# Patient Record
Sex: MALE | Race: WHITE | Employment: OTHER | URBAN - NONMETROPOLITAN AREA
[De-identification: names, ages, dates, MRNs, and addresses within clinical notes are randomized per-mention and may not be internally consistent; named-entity substitution may affect disease eponyms.]

---

## 2017-01-03 ENCOUNTER — OFFICE VISIT (OUTPATIENT)
Dept: INTERNAL MEDICINE | Age: 77
End: 2017-01-03

## 2017-01-03 VITALS
HEIGHT: 71 IN | BODY MASS INDEX: 25.34 KG/M2 | DIASTOLIC BLOOD PRESSURE: 60 MMHG | HEART RATE: 54 BPM | WEIGHT: 181 LBS | SYSTOLIC BLOOD PRESSURE: 130 MMHG

## 2017-01-03 DIAGNOSIS — E10.9 WELL CONTROLLED TYPE 1 DIABETES MELLITUS (HCC): Primary | ICD-10-CM

## 2017-01-03 DIAGNOSIS — Z79.4 TYPE 2 DIABETES MELLITUS WITHOUT COMPLICATION, WITH LONG-TERM CURRENT USE OF INSULIN (HCC): ICD-10-CM

## 2017-01-03 DIAGNOSIS — I25.10 ASCVD (ARTERIOSCLEROTIC CARDIOVASCULAR DISEASE): ICD-10-CM

## 2017-01-03 DIAGNOSIS — I10 ESSENTIAL HYPERTENSION: ICD-10-CM

## 2017-01-03 DIAGNOSIS — E11.9 TYPE 2 DIABETES MELLITUS WITHOUT COMPLICATION, WITH LONG-TERM CURRENT USE OF INSULIN (HCC): ICD-10-CM

## 2017-01-03 DIAGNOSIS — E78.5 HYPERLIPIDEMIA, UNSPECIFIED HYPERLIPIDEMIA TYPE: ICD-10-CM

## 2017-01-03 PROCEDURE — 93000 ELECTROCARDIOGRAM COMPLETE: CPT | Performed by: INTERNAL MEDICINE

## 2017-01-03 PROCEDURE — 99214 OFFICE O/P EST MOD 30 MIN: CPT | Performed by: INTERNAL MEDICINE

## 2017-06-19 ENCOUNTER — OFFICE VISIT (OUTPATIENT)
Dept: INTERNAL MEDICINE | Age: 77
End: 2017-06-19

## 2017-06-19 VITALS
DIASTOLIC BLOOD PRESSURE: 70 MMHG | BODY MASS INDEX: 24.92 KG/M2 | HEART RATE: 52 BPM | SYSTOLIC BLOOD PRESSURE: 130 MMHG | WEIGHT: 178 LBS | HEIGHT: 71 IN

## 2017-06-19 DIAGNOSIS — I25.10 ASCVD (ARTERIOSCLEROTIC CARDIOVASCULAR DISEASE): Primary | ICD-10-CM

## 2017-06-19 DIAGNOSIS — E78.5 HYPERLIPIDEMIA, UNSPECIFIED HYPERLIPIDEMIA TYPE: ICD-10-CM

## 2017-06-19 DIAGNOSIS — E11.9 TYPE 2 DIABETES MELLITUS WITHOUT COMPLICATION, WITH LONG-TERM CURRENT USE OF INSULIN (HCC): ICD-10-CM

## 2017-06-19 DIAGNOSIS — I10 ESSENTIAL HYPERTENSION: ICD-10-CM

## 2017-06-19 DIAGNOSIS — Z79.4 TYPE 2 DIABETES MELLITUS WITHOUT COMPLICATION, WITH LONG-TERM CURRENT USE OF INSULIN (HCC): ICD-10-CM

## 2017-06-19 LAB — HBA1C MFR BLD: 6 %

## 2017-06-19 PROCEDURE — 99214 OFFICE O/P EST MOD 30 MIN: CPT | Performed by: INTERNAL MEDICINE

## 2017-06-19 PROCEDURE — G8420 CALC BMI NORM PARAMETERS: HCPCS | Performed by: INTERNAL MEDICINE

## 2017-06-19 PROCEDURE — 4040F PNEUMOC VAC/ADMIN/RCVD: CPT | Performed by: INTERNAL MEDICINE

## 2017-06-19 PROCEDURE — G8598 ASA/ANTIPLAT THER USED: HCPCS | Performed by: INTERNAL MEDICINE

## 2017-06-19 PROCEDURE — 3017F COLORECTAL CA SCREEN DOC REV: CPT | Performed by: INTERNAL MEDICINE

## 2017-06-19 PROCEDURE — 3046F HEMOGLOBIN A1C LEVEL >9.0%: CPT | Performed by: INTERNAL MEDICINE

## 2017-06-19 PROCEDURE — 83036 HEMOGLOBIN GLYCOSYLATED A1C: CPT | Performed by: INTERNAL MEDICINE

## 2017-06-19 PROCEDURE — 93000 ELECTROCARDIOGRAM COMPLETE: CPT | Performed by: INTERNAL MEDICINE

## 2017-06-19 PROCEDURE — 1123F ACP DISCUSS/DSCN MKR DOCD: CPT | Performed by: INTERNAL MEDICINE

## 2017-06-19 PROCEDURE — 1036F TOBACCO NON-USER: CPT | Performed by: INTERNAL MEDICINE

## 2017-06-19 PROCEDURE — G8427 DOCREV CUR MEDS BY ELIG CLIN: HCPCS | Performed by: INTERNAL MEDICINE

## 2017-06-19 ASSESSMENT — PATIENT HEALTH QUESTIONNAIRE - PHQ9
2. FEELING DOWN, DEPRESSED OR HOPELESS: 0
SUM OF ALL RESPONSES TO PHQ9 QUESTIONS 1 & 2: 0
SUM OF ALL RESPONSES TO PHQ QUESTIONS 1-9: 0
1. LITTLE INTEREST OR PLEASURE IN DOING THINGS: 0

## 2017-09-25 DIAGNOSIS — E11.9 TYPE 2 DIABETES MELLITUS WITHOUT COMPLICATION, WITH LONG-TERM CURRENT USE OF INSULIN (HCC): ICD-10-CM

## 2017-09-25 DIAGNOSIS — Z79.4 TYPE 2 DIABETES MELLITUS WITHOUT COMPLICATION, WITH LONG-TERM CURRENT USE OF INSULIN (HCC): ICD-10-CM

## 2017-09-27 RX ORDER — INSULIN DETEMIR 100 [IU]/ML
INJECTION, SOLUTION SUBCUTANEOUS
Qty: 15 PEN | Refills: 3 | Status: SHIPPED | OUTPATIENT
Start: 2017-09-27 | End: 2019-06-13

## 2017-12-12 ENCOUNTER — OFFICE VISIT (OUTPATIENT)
Dept: INTERNAL MEDICINE CLINIC | Age: 77
End: 2017-12-12
Payer: MEDICARE

## 2017-12-12 ENCOUNTER — NURSE ONLY (OUTPATIENT)
Dept: INTERNAL MEDICINE CLINIC | Age: 77
End: 2017-12-12
Payer: MEDICARE

## 2017-12-12 VITALS
WEIGHT: 174 LBS | HEART RATE: 60 BPM | DIASTOLIC BLOOD PRESSURE: 72 MMHG | BODY MASS INDEX: 24.36 KG/M2 | HEIGHT: 71 IN | SYSTOLIC BLOOD PRESSURE: 136 MMHG

## 2017-12-12 DIAGNOSIS — I10 ESSENTIAL HYPERTENSION: ICD-10-CM

## 2017-12-12 DIAGNOSIS — Z23 NEED FOR PROPHYLACTIC VACCINATION AND INOCULATION AGAINST INFLUENZA: ICD-10-CM

## 2017-12-12 DIAGNOSIS — E78.5 HYPERLIPIDEMIA, UNSPECIFIED HYPERLIPIDEMIA TYPE: ICD-10-CM

## 2017-12-12 DIAGNOSIS — I25.10 ASCVD (ARTERIOSCLEROTIC CARDIOVASCULAR DISEASE): Primary | ICD-10-CM

## 2017-12-12 DIAGNOSIS — Z79.4 TYPE 2 DIABETES MELLITUS WITHOUT COMPLICATION, WITH LONG-TERM CURRENT USE OF INSULIN (HCC): ICD-10-CM

## 2017-12-12 DIAGNOSIS — E11.9 TYPE 2 DIABETES MELLITUS WITHOUT COMPLICATION, WITH LONG-TERM CURRENT USE OF INSULIN (HCC): ICD-10-CM

## 2017-12-12 LAB
ALT SERPL-CCNC: 7 U/L (ref 11–66)
HBA1C MFR BLD: 5.9 %
LDL CHOLESTEROL DIRECT: 68.57 MG/DL

## 2017-12-12 PROCEDURE — 90662 IIV NO PRSV INCREASED AG IM: CPT | Performed by: INTERNAL MEDICINE

## 2017-12-12 PROCEDURE — G8420 CALC BMI NORM PARAMETERS: HCPCS | Performed by: INTERNAL MEDICINE

## 2017-12-12 PROCEDURE — 36415 COLL VENOUS BLD VENIPUNCTURE: CPT | Performed by: INTERNAL MEDICINE

## 2017-12-12 PROCEDURE — 83036 HEMOGLOBIN GLYCOSYLATED A1C: CPT | Performed by: INTERNAL MEDICINE

## 2017-12-12 PROCEDURE — 93000 ELECTROCARDIOGRAM COMPLETE: CPT | Performed by: INTERNAL MEDICINE

## 2017-12-12 PROCEDURE — 1036F TOBACCO NON-USER: CPT | Performed by: INTERNAL MEDICINE

## 2017-12-12 PROCEDURE — G8427 DOCREV CUR MEDS BY ELIG CLIN: HCPCS | Performed by: INTERNAL MEDICINE

## 2017-12-12 PROCEDURE — 3017F COLORECTAL CA SCREEN DOC REV: CPT | Performed by: INTERNAL MEDICINE

## 2017-12-12 PROCEDURE — 3044F HG A1C LEVEL LT 7.0%: CPT | Performed by: INTERNAL MEDICINE

## 2017-12-12 PROCEDURE — 4040F PNEUMOC VAC/ADMIN/RCVD: CPT | Performed by: INTERNAL MEDICINE

## 2017-12-12 PROCEDURE — 99214 OFFICE O/P EST MOD 30 MIN: CPT | Performed by: INTERNAL MEDICINE

## 2017-12-12 PROCEDURE — G0008 ADMIN INFLUENZA VIRUS VAC: HCPCS | Performed by: INTERNAL MEDICINE

## 2017-12-12 PROCEDURE — G8484 FLU IMMUNIZE NO ADMIN: HCPCS | Performed by: INTERNAL MEDICINE

## 2017-12-12 PROCEDURE — 1123F ACP DISCUSS/DSCN MKR DOCD: CPT | Performed by: INTERNAL MEDICINE

## 2017-12-12 PROCEDURE — G8598 ASA/ANTIPLAT THER USED: HCPCS | Performed by: INTERNAL MEDICINE

## 2017-12-12 NOTE — PROGRESS NOTES
daily. No current facility-administered medications for this visit. Review of Systems - General ROS: negative  Psychological ROS: negative  Respiratory ROS: no cough, shortness of breath, or wheezing  Cardiovascular ROS: no chest pain or dyspnea on exertion  Gastrointestinal ROS: no abdominal pain, change in bowel habits, or black or bloody stools  Genito-Urinary ROS: no dysuria, trouble voiding, or hematuria  Musculoskeletal ROS: negative  Neurological ROS: no TIA or stroke symptoms  Dermatological ROS: negative        Blood pressure 136/72, pulse 60, height 5' 10.87\" (1.8 m), weight 174 lb (78.9 kg). Physical Examination: General appearance - alert, well appearing, and in no distress  Mental status - alert, oriented to person, place, and time  Neck - Neck is supple, no JVD or carotid bruits. No thyromegaly or adenopathy. Chest - clear to auscultation, no wheezes, rales or rhonchi, symmetric air entry  Heart - normal rate, regular rhythm, normal S1, S2, no murmurs, rubs, clicks or gallops  Abdomen - soft, nontender, nondistended, no masses or organomegaly  Neurological - alert, oriented, normal speech, no focal findings or movement disorder noted  Extremities - peripheral pulses normal, no pedal edema, no clubbing or cyanosis  Skin - normal coloration and turgor, no rashes, no suspicious skin lesions noted  DIABETIC FOOT EXAM           1. Type 2 diabetes mellitus without complication, with long-term current use of insulin ([facility])  POCT glycosylated hemoglobin (Hb A1C)    23472 - Collection Capillary Blood Specimen   2. Essential hypertension  EKG 12 Lead   3. Need for prophylactic vaccination and inoculation against influenza  INFLUENZA, HIGH DOSE, 65 YRS +, IM, PF, PREFILL SYR, 0.5ML (FLUZONE HD)    NC ADMIN INFLUENZA VIRUS VAC   4. Hyperlipidemia, unspecified hyperlipidemia type  ALT    LDL Cholesterol, Direct   5.  ASCVD (arteriosclerotic cardiovascular disease)       EKG shows Normal sinus rhythm, normal      Extensive counseling was done regarding diabetes. The goals are to decrease or prevent the complications of diabetes and improve survival. The following goals were discussed  HgbA1c < 7 (providing no hypoglycemia)    BMI  18-25    BP < 130/80    STATIN(medium or high risk)    DIET <20% protein  < 30% fat, no trans fats, calorie restricted if BMI high    URINE MICROALBUMIN/CREATINIE  < 30     DILATED EYE EXAM EVERY 1-2 YEARS    MONITOR FEET FOR SORES, NEUROPATHY, ETC    REGULAR DENTAL CARE  Hemoglobin A1c is 5.7  Per the 2013 ACC/AHA guidelines on the treatment of blood cholesterol to reduce atherosclerotic cardiovascular risk in adults there are four major statin benefit groups. Individuals   1) with clinical ASCVD    2) with primary elevations of LDL-C> 190 mg/dl    3) without clinical ASCVD or diabetes with LDL-C >70 to 189 mg/dl and estimated 10 year ASCVD risk> 7.5%    4) with diabetes aged 43-69 with LDL-C 70 to 189 mg/dl and without clinical ASCVD    Patient falls in to group 1      The recommended treatment for each group is as follows:  1) high-intensity statin, if age > 76 or not a candidate for high-intensity statin moderate intensity statin    2) high- intensity statin( moderate if not candidate for high)    3)moderate-to-high intensity statin    4) moderate -intensity statin, however if 10 year ASCVD risk > 7.5%, high-intensity statin    High-risk statin lowers LDL-C > 50%( atorvastatin 40mg, 80 mg and rosuvastatin 20 mg, 40 mg)  Moderate risk statin lowers LDL-C 30% to 50%( simvastatin 20-40mg, atorvastatin 10, 20 mg, rosuvastatin 5, 10 mg, pravastatin 40, 80 mg, lovastatin 40 mg, fluvastatinXL 80 mg, fluvastatin 40 mg bid, pitavastatin 2-4 mg)  Patient is currently on atorvastatin 80 mg po daily  I discussed the situation at length with patient and answered all questions  I recommend the flu shot for the patient today.  Risks and benefits were discussed and patient was given the vaccine          Follow-up here 6 months

## 2017-12-12 NOTE — PROGRESS NOTES
After obtaining consent, and per orders of Dr. Angela Shepard, injection of Fluzone HD given in Right deltoid by Morris Bustillo. Patient instructed to remain in clinic for 20 minutes afterwards, and to report any adverse reaction to me immediately.

## 2018-04-09 ENCOUNTER — HOSPITAL ENCOUNTER (EMERGENCY)
Age: 78
Discharge: HOME | End: 2018-04-10
Attending: FAMILY MEDICINE
Payer: MEDICARE

## 2018-04-09 ENCOUNTER — APPOINTMENT (OUTPATIENT)
Dept: CT IMAGING | Age: 78
End: 2018-04-09
Payer: MEDICARE

## 2018-04-09 ENCOUNTER — APPOINTMENT (OUTPATIENT)
Dept: GENERAL RADIOLOGY | Age: 78
End: 2018-04-09
Payer: MEDICARE

## 2018-04-09 DIAGNOSIS — R31.0 GROSS HEMATURIA: Primary | ICD-10-CM
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
## 2018-04-09 LAB
ALBUMIN SERPL-MCNC: 4.1 G/DL (ref 3.5–5.1)
ALP BLD-CCNC: 79 U/L (ref 38–126)
ALT SERPL-CCNC: 9 U/L (ref 11–66)
ANION GAP SERPL CALCULATED.3IONS-SCNC: 12 MEQ/L (ref 8–16)
APTT: 30.1 SECONDS (ref 22–38)
AST SERPL-CCNC: 42 U/L (ref 5–40)
BACTERIA: ABNORMAL /HPF
BASOPHILS # BLD: 0.4 %
BASOPHILS ABSOLUTE: 0 THOU/MM3 (ref 0–0.1)
BILIRUB SERPL-MCNC: 0.6 MG/DL (ref 0.3–1.2)
BILIRUBIN URINE: ABNORMAL
BILIRUBIN URINE: NEGATIVE
BLOOD, URINE: ABNORMAL
BLOOD, URINE: ABNORMAL
BUN BLDV-MCNC: 26 MG/DL (ref 7–22)
BUN, WHOLE BLOOD: 28 MG/DL (ref 8–26)
CALCIUM SERPL-MCNC: 9.1 MG/DL (ref 8.5–10.5)
CASTS 2: ABNORMAL /LPF
CASTS UA: ABNORMAL /LPF
CHARACTER, URINE: ABNORMAL
CHARACTER, URINE: CLEAR
CHLORIDE BLD-SCNC: 98 MEQ/L (ref 98–111)
CHLORIDE, WHOLE BLOOD: 104 MEQ/L (ref 98–109)
CO2: 29 MEQ/L (ref 23–33)
COLOR: ABNORMAL
COLOR: ABNORMAL
CREAT SERPL-MCNC: 1 MG/DL (ref 0.4–1.2)
CREATININE, WHOLE BLOOD: 1.3 MG/DL (ref 0.5–1.2)
CRYSTALS, UA: ABNORMAL
EOSINOPHIL # BLD: 3.3 %
EOSINOPHILS ABSOLUTE: 0.2 THOU/MM3 (ref 0–0.4)
EPITHELIAL CELLS, UA: ABNORMAL /HPF
GFR SERPL CREATININE-BSD FRML MDRD: 73 ML/MIN/1.73M2
GFR, ESTIMATED: 57 ML/MIN/1.73M2
GLUCOSE BLD-MCNC: 100 MG/DL (ref 70–108)
GLUCOSE URINE: NEGATIVE MG/DL
GLUCOSE, URINE: NEGATIVE MG/DL
GLUCOSE, WHOLE BLOOD: 103 MG/DL (ref 70–108)
HCT VFR BLD CALC: 36.8 % (ref 42–52)
HEMOGLOBIN: 12.6 GM/DL (ref 14–18)
INR BLD: 0.89 (ref 0.85–1.13)
KETONES, URINE: ABNORMAL
KETONES, URINE: ABNORMAL
LACTIC ACID: 1.1 MMOL/L (ref 0.5–2.2)
LEUKOCYTE ESTERASE, URINE: ABNORMAL
LEUKOCYTES, UA: ABNORMAL
LIPASE: 45.5 U/L (ref 5.6–51.3)
LYMPHOCYTES # BLD: 21.5 %
LYMPHOCYTES ABSOLUTE: 1.3 THOU/MM3 (ref 1–4.8)
MCH RBC QN AUTO: 31.3 PG (ref 27–31)
MCHC RBC AUTO-ENTMCNC: 34.2 GM/DL (ref 33–37)
MCV RBC AUTO: 91 FL (ref 80–94)
MISCELLANEOUS 2: ABNORMAL
MONOCYTES # BLD: 9.7 %
MONOCYTES ABSOLUTE: 0.6 THOU/MM3 (ref 0.4–1.3)
NITRATE, UA: NEGATIVE
NITRITE, URINE: NEGATIVE
NUCLEATED RED BLOOD CELLS: 0 /100 WBC
OSMOLALITY CALCULATION: 282.4 MOSMOL/KG (ref 275–300)
PDW BLD-RTO: 13.8 % (ref 11.5–14.5)
PH UA: 6.5
PH UA: 6.5 (ref 5–9)
PLATELET # BLD: 180 THOU/MM3 (ref 130–400)
PMV BLD AUTO: 9.2 FL (ref 7.4–10.4)
POTASSIUM REFLEX MAGNESIUM: 4.2 MEQ/L (ref 3.5–5.2)
POTASSIUM, WHOLE BLOOD: 4.6 MEQ/L (ref 3.5–4.9)
PROTEIN UA: 30
PROTEIN UA: >= 300 MG/DL
RBC # BLD: 4.03 MILL/MM3 (ref 4.7–6.1)
RBC URINE: > 200 /HPF
REFLEX TO URINE C & S: ABNORMAL
RENAL EPITHELIAL, UA: ABNORMAL
SEG NEUTROPHILS: 65.1 %
SEGMENTED NEUTROPHILS ABSOLUTE COUNT: 4 THOU/MM3 (ref 1.8–7.7)
SODIUM BLD-SCNC: 139 MEQ/L (ref 135–145)
SODIUM, WHOLE BLOOD: 140 MEQ/L (ref 138–146)
SPECIFIC GRAVITY UA: 1.02 (ref 1–1.03)
SPECIFIC GRAVITY, URINE: 1.01 (ref 1–1.03)
TOTAL CO2, VENOUS: 29 MEQ/L (ref 23–33)
TOTAL PROTEIN: 6.7 G/DL (ref 6.1–8)
UROBILINOGEN, URINE: 1 EU/DL
UROBILINOGEN, URINE: 4 EU/DL (ref 0–1)
WBC # BLD: 6.2 THOU/MM3 (ref 4.8–10.8)
WBC UA: ABNORMAL /HPF
YEAST: ABNORMAL

## 2018-04-09 PROCEDURE — 82565 ASSAY OF CREATININE: CPT

## 2018-04-09 PROCEDURE — 85610 PROTHROMBIN TIME: CPT

## 2018-04-09 PROCEDURE — 81001 URINALYSIS AUTO W/SCOPE: CPT

## 2018-04-09 PROCEDURE — 85730 THROMBOPLASTIN TIME PARTIAL: CPT

## 2018-04-09 PROCEDURE — 87184 SC STD DISK METHOD PER PLATE: CPT

## 2018-04-09 PROCEDURE — 99284 EMERGENCY DEPT VISIT MOD MDM: CPT

## 2018-04-09 PROCEDURE — 87086 URINE CULTURE/COLONY COUNT: CPT

## 2018-04-09 PROCEDURE — 80053 COMPREHEN METABOLIC PANEL: CPT

## 2018-04-09 PROCEDURE — 2580000003 HC RX 258: Performed by: FAMILY MEDICINE

## 2018-04-09 PROCEDURE — 99215 OFFICE O/P EST HI 40 MIN: CPT

## 2018-04-09 PROCEDURE — 36415 COLL VENOUS BLD VENIPUNCTURE: CPT

## 2018-04-09 PROCEDURE — 87077 CULTURE AEROBIC IDENTIFY: CPT

## 2018-04-09 PROCEDURE — 73030 X-RAY EXAM OF SHOULDER: CPT

## 2018-04-09 PROCEDURE — 87186 SC STD MICRODIL/AGAR DIL: CPT

## 2018-04-09 PROCEDURE — 81003 URINALYSIS AUTO W/O SCOPE: CPT

## 2018-04-09 PROCEDURE — 84520 ASSAY OF UREA NITROGEN: CPT

## 2018-04-09 PROCEDURE — 74177 CT ABD & PELVIS W/CONTRAST: CPT

## 2018-04-09 PROCEDURE — 6360000004 HC RX CONTRAST MEDICATION: Performed by: FAMILY MEDICINE

## 2018-04-09 PROCEDURE — 83690 ASSAY OF LIPASE: CPT

## 2018-04-09 PROCEDURE — 80051 ELECTROLYTE PANEL: CPT

## 2018-04-09 PROCEDURE — 83605 ASSAY OF LACTIC ACID: CPT

## 2018-04-09 PROCEDURE — 82947 ASSAY GLUCOSE BLOOD QUANT: CPT

## 2018-04-09 PROCEDURE — 85025 COMPLETE CBC W/AUTO DIFF WBC: CPT

## 2018-04-09 RX ORDER — 0.9 % SODIUM CHLORIDE 0.9 %
500 INTRAVENOUS SOLUTION INTRAVENOUS ONCE
Status: COMPLETED | OUTPATIENT
Start: 2018-04-09 | End: 2018-04-10

## 2018-04-09 RX ORDER — 0.9 % SODIUM CHLORIDE 0.9 %
1000 INTRAVENOUS SOLUTION INTRAVENOUS ONCE
Status: DISCONTINUED | OUTPATIENT
Start: 2018-04-09 | End: 2018-04-10 | Stop reason: HOSPADM

## 2018-04-09 RX ADMIN — IOPAMIDOL 80 ML: 755 INJECTION, SOLUTION INTRAVENOUS at 21:39

## 2018-04-09 RX ADMIN — SODIUM CHLORIDE 500 ML: 9 INJECTION, SOLUTION INTRAVENOUS at 21:30

## 2018-04-09 ASSESSMENT — ENCOUNTER SYMPTOMS
CONSTIPATION: 0
ABDOMINAL PAIN: 0
COLOR CHANGE: 0
SHORTNESS OF BREATH: 0
BLOOD IN STOOL: 0
NAUSEA: 0
DIARRHEA: 0
VOMITING: 0

## 2018-04-10 ENCOUNTER — PROCEDURE VISIT (OUTPATIENT)
Dept: UROLOGY | Age: 78
End: 2018-04-10
Payer: MEDICARE

## 2018-04-10 ENCOUNTER — OFFICE VISIT (OUTPATIENT)
Dept: UROLOGY | Age: 78
End: 2018-04-10
Payer: MEDICARE

## 2018-04-10 VITALS
BODY MASS INDEX: 23.94 KG/M2 | DIASTOLIC BLOOD PRESSURE: 63 MMHG | SYSTOLIC BLOOD PRESSURE: 132 MMHG | TEMPERATURE: 98.4 F | OXYGEN SATURATION: 98 % | RESPIRATION RATE: 20 BRPM | WEIGHT: 171 LBS | HEART RATE: 65 BPM

## 2018-04-10 VITALS
WEIGHT: 176.3 LBS | HEIGHT: 71 IN | DIASTOLIC BLOOD PRESSURE: 60 MMHG | BODY MASS INDEX: 24.68 KG/M2 | SYSTOLIC BLOOD PRESSURE: 122 MMHG

## 2018-04-10 VITALS
DIASTOLIC BLOOD PRESSURE: 60 MMHG | WEIGHT: 176.3 LBS | BODY MASS INDEX: 24.68 KG/M2 | HEIGHT: 71 IN | SYSTOLIC BLOOD PRESSURE: 122 MMHG

## 2018-04-10 DIAGNOSIS — R31.0 GROSS HEMATURIA: ICD-10-CM

## 2018-04-10 DIAGNOSIS — N28.1 RENAL CYST, RIGHT: Primary | ICD-10-CM

## 2018-04-10 DIAGNOSIS — R31.0 GROSS HEMATURIA: Primary | ICD-10-CM

## 2018-04-10 PROCEDURE — 1123F ACP DISCUSS/DSCN MKR DOCD: CPT | Performed by: NURSE PRACTITIONER

## 2018-04-10 PROCEDURE — 99203 OFFICE O/P NEW LOW 30 MIN: CPT | Performed by: NURSE PRACTITIONER

## 2018-04-10 PROCEDURE — G8427 DOCREV CUR MEDS BY ELIG CLIN: HCPCS | Performed by: NURSE PRACTITIONER

## 2018-04-10 PROCEDURE — 4040F PNEUMOC VAC/ADMIN/RCVD: CPT | Performed by: NURSE PRACTITIONER

## 2018-04-10 PROCEDURE — G8420 CALC BMI NORM PARAMETERS: HCPCS | Performed by: NURSE PRACTITIONER

## 2018-04-10 PROCEDURE — 99999 PR OFFICE/OUTPT VISIT,PROCEDURE ONLY: CPT | Performed by: UROLOGY

## 2018-04-10 PROCEDURE — 52000 CYSTOURETHROSCOPY: CPT | Performed by: UROLOGY

## 2018-04-10 PROCEDURE — G8598 ASA/ANTIPLAT THER USED: HCPCS | Performed by: NURSE PRACTITIONER

## 2018-04-10 PROCEDURE — 1036F TOBACCO NON-USER: CPT | Performed by: NURSE PRACTITIONER

## 2018-04-10 PROCEDURE — 6370000000 HC RX 637 (ALT 250 FOR IP): Performed by: FAMILY MEDICINE

## 2018-04-10 RX ORDER — LEVOFLOXACIN 500 MG/1
500 TABLET, FILM COATED ORAL DAILY
Qty: 10 TABLET | Refills: 0 | Status: SHIPPED | OUTPATIENT
Start: 2018-04-10 | End: 2018-04-20

## 2018-04-10 RX ORDER — LEVOFLOXACIN 500 MG/1
500 TABLET, FILM COATED ORAL ONCE
Status: COMPLETED | OUTPATIENT
Start: 2018-04-10 | End: 2018-04-10

## 2018-04-10 RX ADMIN — LEVOFLOXACIN 500 MG: 500 TABLET, FILM COATED ORAL at 00:38

## 2018-04-11 LAB
ORGANISM: ABNORMAL
ORGANISM: ABNORMAL
URINE CULTURE REFLEX: ABNORMAL
URINE CULTURE REFLEX: ABNORMAL

## 2018-04-16 ENCOUNTER — HOSPITAL ENCOUNTER (OUTPATIENT)
Dept: ULTRASOUND IMAGING | Age: 78
Discharge: HOME | End: 2018-04-16
Payer: MEDICARE

## 2018-04-16 DIAGNOSIS — N28.1 RENAL CYST, RIGHT: ICD-10-CM

## 2018-04-16 LAB — CYTOLOGY-NON GYN: NORMAL

## 2018-04-16 PROCEDURE — 76770 US EXAM ABDO BACK WALL COMP: CPT

## 2018-04-17 ENCOUNTER — OFFICE VISIT (OUTPATIENT)
Dept: UROLOGY | Age: 78
End: 2018-04-17
Payer: MEDICARE

## 2018-04-17 VITALS
HEIGHT: 69 IN | SYSTOLIC BLOOD PRESSURE: 120 MMHG | DIASTOLIC BLOOD PRESSURE: 78 MMHG | WEIGHT: 174 LBS | BODY MASS INDEX: 25.77 KG/M2

## 2018-04-17 DIAGNOSIS — R31.0 GROSS HEMATURIA: Primary | ICD-10-CM

## 2018-04-17 LAB
BILIRUBIN URINE: NEGATIVE
BLOOD URINE, POC: NEGATIVE
CHARACTER, URINE: CLEAR
COLOR, URINE: YELLOW
GLUCOSE URINE: NEGATIVE MG/DL
KETONES, URINE: NEGATIVE
LEUKOCYTE CLUMPS, URINE: NEGATIVE
NITRITE, URINE: NEGATIVE
PH, URINE: 5.5
PROTEIN, URINE: NEGATIVE MG/DL
SPECIFIC GRAVITY, URINE: 1.02 (ref 1–1.03)
UROBILINOGEN, URINE: 0.2 EU/DL

## 2018-04-17 PROCEDURE — 81003 URINALYSIS AUTO W/O SCOPE: CPT | Performed by: UROLOGY

## 2018-04-17 PROCEDURE — 99213 OFFICE O/P EST LOW 20 MIN: CPT | Performed by: UROLOGY

## 2018-06-12 ENCOUNTER — OFFICE VISIT (OUTPATIENT)
Dept: INTERNAL MEDICINE CLINIC | Age: 78
End: 2018-06-12
Payer: MEDICARE

## 2018-06-12 VITALS
HEIGHT: 69 IN | HEART RATE: 60 BPM | WEIGHT: 174 LBS | SYSTOLIC BLOOD PRESSURE: 138 MMHG | BODY MASS INDEX: 25.77 KG/M2 | DIASTOLIC BLOOD PRESSURE: 60 MMHG

## 2018-06-12 DIAGNOSIS — E78.5 HYPERLIPIDEMIA, UNSPECIFIED HYPERLIPIDEMIA TYPE: ICD-10-CM

## 2018-06-12 DIAGNOSIS — I25.10 ASCVD (ARTERIOSCLEROTIC CARDIOVASCULAR DISEASE): Primary | ICD-10-CM

## 2018-06-12 DIAGNOSIS — I10 ESSENTIAL HYPERTENSION: ICD-10-CM

## 2018-06-12 DIAGNOSIS — E11.9 TYPE 2 DIABETES MELLITUS WITHOUT COMPLICATION, WITH LONG-TERM CURRENT USE OF INSULIN (HCC): ICD-10-CM

## 2018-06-12 DIAGNOSIS — Z79.4 TYPE 2 DIABETES MELLITUS WITHOUT COMPLICATION, WITH LONG-TERM CURRENT USE OF INSULIN (HCC): ICD-10-CM

## 2018-06-12 PROCEDURE — G8427 DOCREV CUR MEDS BY ELIG CLIN: HCPCS | Performed by: INTERNAL MEDICINE

## 2018-06-12 PROCEDURE — 1123F ACP DISCUSS/DSCN MKR DOCD: CPT | Performed by: INTERNAL MEDICINE

## 2018-06-12 PROCEDURE — G8598 ASA/ANTIPLAT THER USED: HCPCS | Performed by: INTERNAL MEDICINE

## 2018-06-12 PROCEDURE — 4040F PNEUMOC VAC/ADMIN/RCVD: CPT | Performed by: INTERNAL MEDICINE

## 2018-06-12 PROCEDURE — 93000 ELECTROCARDIOGRAM COMPLETE: CPT | Performed by: INTERNAL MEDICINE

## 2018-06-12 PROCEDURE — G8419 CALC BMI OUT NRM PARAM NOF/U: HCPCS | Performed by: INTERNAL MEDICINE

## 2018-06-12 PROCEDURE — 99214 OFFICE O/P EST MOD 30 MIN: CPT | Performed by: INTERNAL MEDICINE

## 2018-06-12 PROCEDURE — 1036F TOBACCO NON-USER: CPT | Performed by: INTERNAL MEDICINE

## 2018-06-12 RX ORDER — CYANOCOBALAMIN 1000 UG/ML
1000 INJECTION INTRAMUSCULAR; SUBCUTANEOUS
COMMUNITY
End: 2021-01-21 | Stop reason: ALTCHOICE

## 2018-06-15 LAB
ALT SERPL-CCNC: 10 U/L (ref 5–50)
ANION GAP SERPL CALCULATED.3IONS-SCNC: 12 MEQ/L (ref 10–19)
AVERAGE GLUCOSE: 120 MG/DL (ref 66–114)
BUN BLDV-MCNC: 27 MG/DL (ref 8–23)
CALCIUM SERPL-MCNC: 9.5 MG/DL (ref 8.5–10.5)
CHLORIDE BLD-SCNC: 100 MEQ/L (ref 95–107)
CO2: 29 MEQ/L (ref 19–31)
CREAT SERPL-MCNC: 1.1 MG/DL (ref 0.8–1.4)
EGFR AFRICAN AMERICAN: 75.2 ML/MIN/1.73 M2
EGFR IF NONAFRICAN AMERICAN: 64.9 ML/MIN/1.73 M2
GLUCOSE: 95 MG/DL (ref 70–99)
HBA1C MFR BLD: 5.8 % (ref 4.2–5.8)
LDL CHOLESTEROL DIRECT: 72 MG/DL
POTASSIUM SERPL-SCNC: 5.3 MEQ/L (ref 3.5–5.4)
SODIUM BLD-SCNC: 141 MEQ/L (ref 135–146)

## 2018-07-16 ENCOUNTER — PROCEDURE VISIT (OUTPATIENT)
Dept: UROLOGY | Age: 78
End: 2018-07-16
Payer: MEDICARE

## 2018-07-16 VITALS
HEIGHT: 71 IN | DIASTOLIC BLOOD PRESSURE: 72 MMHG | WEIGHT: 174 LBS | BODY MASS INDEX: 24.36 KG/M2 | SYSTOLIC BLOOD PRESSURE: 120 MMHG

## 2018-07-16 DIAGNOSIS — R31.0 GROSS HEMATURIA: Primary | ICD-10-CM

## 2018-07-16 LAB
BILIRUBIN URINE: NEGATIVE
BLOOD URINE, POC: ABNORMAL
CHARACTER, URINE: CLEAR
COLOR, URINE: YELLOW
GLUCOSE URINE: NEGATIVE MG/DL
KETONES, URINE: NEGATIVE
LEUKOCYTE CLUMPS, URINE: NEGATIVE
NITRITE, URINE: NEGATIVE
PH, URINE: 5.5
PROTEIN, URINE: NEGATIVE MG/DL
SPECIFIC GRAVITY, URINE: 1.01 (ref 1–1.03)
UROBILINOGEN, URINE: 0.2 EU/DL

## 2018-07-16 PROCEDURE — 52000 CYSTOURETHROSCOPY: CPT | Performed by: UROLOGY

## 2018-07-16 PROCEDURE — 99999 PR OFFICE/OUTPT VISIT,PROCEDURE ONLY: CPT | Performed by: UROLOGY

## 2018-07-16 PROCEDURE — 81003 URINALYSIS AUTO W/O SCOPE: CPT | Performed by: UROLOGY

## 2018-07-16 NOTE — PROGRESS NOTES
Mr. Charlie Rizvi was seen in follow up for surveillance cystoscopy. This was completed today without difficulty    Past Medical History:   Diagnosis Date    CAD (coronary artery disease)     % MANAGED MEDICALLY    Hyperlipidemia     Hypertension     Left ventricular dysfunction     MILD    Type II or unspecified type diabetes mellitus without mention of complication, not stated as uncontrolled        Past Surgical History:   Procedure Laterality Date    ADENOIDECTOMY  at the age of 13 yrs     TONSILLECTOMY  at the age of 13 yrs old       Current Outpatient Prescriptions on File Prior to Visit   Medication Sig Dispense Refill    cyanocobalamin 1000 MCG/ML injection Inject 1,000 mcg into the muscle every 30 days      LEVEMIR FLEXTOUCH 100 UNIT/ML injection pen INJECT 20 UNITS NIGHTLY 15 Pen 3    lisinopril (PRINIVIL;ZESTRIL) 2.5 MG tablet Take 2.5 mg by mouth daily      Glucose Blood (BLOOD GLUCOSE TEST STRIPS) STRP Test Blood Sugars once daily. Accu-Check test strips. Dx E11.9 100 strip 3    metoprolol (LOPRESSOR) 50 MG tablet Take 0.5 tablets by mouth 2 times daily 60 tablet 1    Insulin Pen Needle 31G X 5 MM MISC 1 each by Does not apply route 3 times daily 300 each 5    metFORMIN (GLUCOPHAGE) 1000 MG tablet Take 1 tablet by mouth 2 times daily (with meals) 180 tablet 3    Multiple Vitamin (MULTI-VITAMIN PO) Take  by mouth.  aspirin 81 MG tablet Take 81 mg by mouth daily.  atorvastatin (LIPITOR) 80 MG tablet Take 80 mg by mouth daily.  nitroGLYCERIN (NITROSTAT) 0.4 MG SL tablet Place 1 tablet under the tongue every 5 minutes as needed. 25 tablet 5     No current facility-administered medications on file prior to visit. No Known Allergies    Family History   Problem Relation Age of Onset    Prostate Cancer Paternal Uncle        Social History     Social History    Marital status:       Spouse name: N/A    Number of children: N/A    Years of education: N/A normal.  The prostate was moderately obstructing. The bladder was evaluated in its endoscopic entirety and found to be normal.  There were no tumors, stones, ulcers or foreign bodies. There were no mucosal abnormalities. The ureteral orifices were seen and were normal.  The scope was removed. The patient tolerated the procedure and there were no complications. A dose of 500 mg ciprofloxacin was given for the procedure. Impression: Moderate BPH, otherwise normal cystoscopy        Plan:  Normal cystoscopy today. Follow up with [facility] in a year. Cytology prior.

## 2018-08-09 LAB
CHOLESTEROL, TOTAL: 132 MG/DL
CHOLESTEROL/HDL RATIO: NORMAL
HDLC SERPL-MCNC: 40 MG/DL (ref 35–70)
LDL CHOLESTEROL CALCULATED: 50 MG/DL (ref 0–160)
TRIGL SERPL-MCNC: 208 MG/DL
VLDLC SERPL CALC-MCNC: NORMAL MG/DL

## 2018-10-05 ENCOUNTER — CARE COORDINATION (OUTPATIENT)
Dept: CARE COORDINATION | Age: 78
End: 2018-10-05

## 2018-12-05 ENCOUNTER — OFFICE VISIT (OUTPATIENT)
Dept: INTERNAL MEDICINE CLINIC | Age: 78
End: 2018-12-05
Payer: MEDICARE

## 2018-12-05 VITALS
BODY MASS INDEX: 25.06 KG/M2 | WEIGHT: 179 LBS | SYSTOLIC BLOOD PRESSURE: 130 MMHG | DIASTOLIC BLOOD PRESSURE: 62 MMHG | HEIGHT: 71 IN | HEART RATE: 52 BPM

## 2018-12-05 DIAGNOSIS — I10 ESSENTIAL HYPERTENSION: ICD-10-CM

## 2018-12-05 DIAGNOSIS — E78.5 HYPERLIPIDEMIA, UNSPECIFIED HYPERLIPIDEMIA TYPE: ICD-10-CM

## 2018-12-05 DIAGNOSIS — E11.9 TYPE 2 DIABETES MELLITUS WITHOUT COMPLICATION, WITH LONG-TERM CURRENT USE OF INSULIN (HCC): ICD-10-CM

## 2018-12-05 DIAGNOSIS — I25.10 ASCVD (ARTERIOSCLEROTIC CARDIOVASCULAR DISEASE): Primary | ICD-10-CM

## 2018-12-05 DIAGNOSIS — Z79.4 TYPE 2 DIABETES MELLITUS WITHOUT COMPLICATION, WITH LONG-TERM CURRENT USE OF INSULIN (HCC): ICD-10-CM

## 2018-12-05 PROCEDURE — 1101F PT FALLS ASSESS-DOCD LE1/YR: CPT | Performed by: INTERNAL MEDICINE

## 2018-12-05 PROCEDURE — G8427 DOCREV CUR MEDS BY ELIG CLIN: HCPCS | Performed by: INTERNAL MEDICINE

## 2018-12-05 PROCEDURE — 4040F PNEUMOC VAC/ADMIN/RCVD: CPT | Performed by: INTERNAL MEDICINE

## 2018-12-05 PROCEDURE — 1123F ACP DISCUSS/DSCN MKR DOCD: CPT | Performed by: INTERNAL MEDICINE

## 2018-12-05 PROCEDURE — G8484 FLU IMMUNIZE NO ADMIN: HCPCS | Performed by: INTERNAL MEDICINE

## 2018-12-05 PROCEDURE — 1036F TOBACCO NON-USER: CPT | Performed by: INTERNAL MEDICINE

## 2018-12-05 PROCEDURE — 93000 ELECTROCARDIOGRAM COMPLETE: CPT | Performed by: INTERNAL MEDICINE

## 2018-12-05 PROCEDURE — 99214 OFFICE O/P EST MOD 30 MIN: CPT | Performed by: INTERNAL MEDICINE

## 2018-12-05 PROCEDURE — G8419 CALC BMI OUT NRM PARAM NOF/U: HCPCS | Performed by: INTERNAL MEDICINE

## 2018-12-05 PROCEDURE — G8598 ASA/ANTIPLAT THER USED: HCPCS | Performed by: INTERNAL MEDICINE

## 2018-12-05 ASSESSMENT — PATIENT HEALTH QUESTIONNAIRE - PHQ9
1. LITTLE INTEREST OR PLEASURE IN DOING THINGS: 0
SUM OF ALL RESPONSES TO PHQ QUESTIONS 1-9: 0
2. FEELING DOWN, DEPRESSED OR HOPELESS: 0
SUM OF ALL RESPONSES TO PHQ9 QUESTIONS 1 & 2: 0
SUM OF ALL RESPONSES TO PHQ QUESTIONS 1-9: 0

## 2018-12-05 NOTE — PROGRESS NOTES
PO) Take  by mouth. No current facility-administered medications for this visit. Review of Systems - General ROS: negative  Psychological ROS: negative  Respiratory ROS: no cough, shortness of breath, or wheezing  Cardiovascular ROS: no chest pain or dyspnea on exertion  Gastrointestinal ROS: no abdominal pain, change in bowel habits, or black or bloody stools  Genito-Urinary ROS: no dysuria, trouble voiding, or hematuria  Musculoskeletal ROS: negative  Neurological ROS: no TIA or stroke symptoms  Dermatological ROS: negative        Blood pressure 130/62, pulse 52, height 5' 10.51\" (1.791 m), weight 179 lb (81.2 kg). Physical Examination: General appearance - alert, well appearing, and in no distress  Mental status - alert, oriented to person, place, and time  Neck - Neck is supple, no JVD or carotid bruits. No thyromegaly or adenopathy. Chest - clear to auscultation, no wheezes, rales or rhonchi, symmetric air entry  Heart - normal rate, regular rhythm, normal S1, S2, no murmurs, rubs, clicks or gallops  Abdomen - soft, nontender, nondistended, no masses or organomegaly  Neurological - alert, oriented, normal speech, no focal findings or movement disorder noted  Extremities - peripheral pulses normal, no pedal edema, no clubbing or cyanosis  Skin - normal coloration and turgor, no rashes, no suspicious skin lesions noted              Diagnosis Orders   1. ASCVD (arteriosclerotic cardiovascular disease)     2. Essential hypertension  EKG 12 Lead   3. Hyperlipidemia, unspecified hyperlipidemia type     4. Type 2 diabetes mellitus without complication, with long-term current use of insulin (HCC)       EKG shows Sinus bradycardia      Extensive counseling was done regarding diabetes.  The goals are to decrease or prevent the complications of diabetes and improve survival. The following goals were discussed  HgbA1c < 7 (providing no hypoglycemia)    BMI  18-25    BP < 130/80    STATIN(medium or high risk)    DIET <20% protein  < 30% fat, no trans fats, calorie restricted if BMI high    URINE MICROALBUMIN/CREATINIE  < 30     DILATED EYE EXAM EVERY 1-2 YEARS    MONITOR FEET FOR SORES, NEUROPATHY, ETC    REGULAR DENTAL CARE    Per the 2016 guidelines regarding cholesterol management and secondary prevention. It is recommended LDL be less than 70. In June, his LDL was 72. He did have recent blood work in the [place], we will get this  He is on a high intensity statin.    Atorvastatin 80 mg by mouth daily  Follow-up 6 months

## 2019-06-13 ENCOUNTER — OFFICE VISIT (OUTPATIENT)
Dept: INTERNAL MEDICINE CLINIC | Age: 79
End: 2019-06-13
Payer: MEDICARE

## 2019-06-13 VITALS
HEIGHT: 71 IN | WEIGHT: 177 LBS | HEART RATE: 58 BPM | DIASTOLIC BLOOD PRESSURE: 70 MMHG | BODY MASS INDEX: 24.78 KG/M2 | SYSTOLIC BLOOD PRESSURE: 136 MMHG

## 2019-06-13 DIAGNOSIS — E11.9 TYPE 2 DIABETES MELLITUS WITHOUT COMPLICATION, WITHOUT LONG-TERM CURRENT USE OF INSULIN (HCC): ICD-10-CM

## 2019-06-13 DIAGNOSIS — E78.5 HYPERLIPIDEMIA, UNSPECIFIED HYPERLIPIDEMIA TYPE: ICD-10-CM

## 2019-06-13 DIAGNOSIS — I10 ESSENTIAL HYPERTENSION: ICD-10-CM

## 2019-06-13 DIAGNOSIS — I25.10 ASCVD (ARTERIOSCLEROTIC CARDIOVASCULAR DISEASE): Primary | ICD-10-CM

## 2019-06-13 LAB — HBA1C MFR BLD: 5.7 % (ref 4.3–5.7)

## 2019-06-13 PROCEDURE — G8598 ASA/ANTIPLAT THER USED: HCPCS | Performed by: INTERNAL MEDICINE

## 2019-06-13 PROCEDURE — 93000 ELECTROCARDIOGRAM COMPLETE: CPT | Performed by: INTERNAL MEDICINE

## 2019-06-13 PROCEDURE — G8427 DOCREV CUR MEDS BY ELIG CLIN: HCPCS | Performed by: INTERNAL MEDICINE

## 2019-06-13 PROCEDURE — 83036 HEMOGLOBIN GLYCOSYLATED A1C: CPT | Performed by: INTERNAL MEDICINE

## 2019-06-13 PROCEDURE — 4040F PNEUMOC VAC/ADMIN/RCVD: CPT | Performed by: INTERNAL MEDICINE

## 2019-06-13 PROCEDURE — 99214 OFFICE O/P EST MOD 30 MIN: CPT | Performed by: INTERNAL MEDICINE

## 2019-06-13 PROCEDURE — G8419 CALC BMI OUT NRM PARAM NOF/U: HCPCS | Performed by: INTERNAL MEDICINE

## 2019-06-13 PROCEDURE — 1036F TOBACCO NON-USER: CPT | Performed by: INTERNAL MEDICINE

## 2019-06-13 PROCEDURE — 1123F ACP DISCUSS/DSCN MKR DOCD: CPT | Performed by: INTERNAL MEDICINE

## 2019-06-13 NOTE — PROGRESS NOTES
Chief Complaint   Patient presents with    Coronary Artery Disease    Diabetes    Hypertension    Hyperlipidemia       Patient presents for evaluation of diabetes, ASCVD   I last saw him 6 months ago  Patient has not had any admissions to the hospital  since the last visit here. Pertinent past history reviewed and summarized below     Hgba1c was 12.3 in July 2015  I started insulin at that time  He was on metformin only      He has been taking Levemir at night    He has a known RCA lesion  managed medically  He denies chest pain, shortness of breath, dizziness or syncope  Denies any hypoglycemia  Past Medical History:   Diagnosis Date    CAD (coronary artery disease)     % MANAGED MEDICALLY    Hyperlipidemia     Hypertension     Left ventricular dysfunction     MILD    Type II or unspecified type diabetes mellitus without mention of complication, not stated as uncontrolled      . Current Outpatient Medications   Medication Sig Dispense Refill    cyanocobalamin 1000 MCG/ML injection Inject 1,000 mcg into the muscle every 30 days      lisinopril (PRINIVIL;ZESTRIL) 2.5 MG tablet Take 2.5 mg by mouth daily      Glucose Blood (BLOOD GLUCOSE TEST STRIPS) STRP Test Blood Sugars once daily. Accu-Check test strips. Dx E11.9 100 strip 3    metoprolol (LOPRESSOR) 50 MG tablet Take 0.5 tablets by mouth 2 times daily 60 tablet 1    metFORMIN (GLUCOPHAGE) 1000 MG tablet Take 1 tablet by mouth 2 times daily (with meals) 180 tablet 3    nitroGLYCERIN (NITROSTAT) 0.4 MG SL tablet Place 1 tablet under the tongue every 5 minutes as needed. 25 tablet 5    Multiple Vitamin (MULTI-VITAMIN PO) Take  by mouth.  aspirin 81 MG tablet Take 81 mg by mouth daily.  atorvastatin (LIPITOR) 80 MG tablet Take 80 mg by mouth daily. No current facility-administered medications for this visit.           Review of Systems - General ROS: negative  Psychological ROS: negative  Respiratory ROS: no cough, shortness of breath, or wheezing  Cardiovascular ROS: no chest pain or dyspnea on exertion  Gastrointestinal ROS: no abdominal pain, change in bowel habits, or black or bloody stools  Genito-Urinary ROS: no dysuria, trouble voiding, or hematuria  Musculoskeletal ROS: negative  Neurological ROS: no TIA or stroke symptoms  Dermatological ROS: negative        Blood pressure 136/70, pulse 58, height 5' 10.5\" (1.791 m), weight 177 lb (80.3 kg). Physical Examination: General appearance - alert, well appearing, and in no distress  Mental status - alert, oriented to person, place, and time  Neck - Neck is supple, no JVD or carotid bruits. No thyromegaly or adenopathy. Chest - clear to auscultation, no wheezes, rales or rhonchi, symmetric air entry  Heart - normal rate, regular rhythm, normal S1, S2, no murmurs, rubs, clicks or gallops  Abdomen - soft, nontender, nondistended, no masses or organomegaly  Neurological - alert, oriented, normal speech, no focal findings or movement disorder noted  Extremities - peripheral pulses normal, no pedal edema, no clubbing or cyanosis  Skin - normal coloration and turgor, no rashes, no suspicious skin lesions noted              Diagnosis Orders   1. ASCVD (arteriosclerotic cardiovascular disease)     2. Essential hypertension  EKG 12 Lead   3. Hyperlipidemia, unspecified hyperlipidemia type     4. Type 2 diabetes mellitus without complication, without long-term current use of insulin (HCC)       EKG shows Sinus bradycardia      Extensive counseling was done regarding diabetes.  The goals are to decrease or prevent the complications of diabetes and improve survival. The following goals were discussed  HgbA1c < 7 (providing no hypoglycemia)    BMI  18-25    BP < 130/80    STATIN(medium or high risk)    DIET <20% protein  < 30% fat, no trans fats, calorie restricted if BMI high    URINE MICROALBUMIN/CREATINIE  < 30     DILATED EYE EXAM EVERY 1-2 YEARS    MONITOR FEET FOR SORES, NEUROPATHY, ETC    REGULAR DENTAL CARE  Hemoglobin A1c is 5.7  Per the 2016 guidelines regarding cholesterol management and secondary prevention. It is recommended LDL be less than 70. In June, his LDL was 72. He did have recent blood work in the [address], we will get this  He is on a high intensity statin.    Atorvastatin 80 mg by mouth daily  Follow-up 6 months

## 2019-07-16 NOTE — PROGRESS NOTES
with normal A/P diameter, no wheezes, rales, or rhonchi noted. Normal respiratory rate and rhthym. No use of accessory muscles. Abdominal:          Soft. No tenderness. Active bowel sounds. Musculoskeletal:    Normal range of motion. She exhibits no edema or tenderness of lower extremities. Extremities:    No cyanosis, clubbing, or edema present. Neurological:    Alert and oriented. No cranial nerve deficit. There are no focalizing motor or sensory deficits.     DATA:  CBC:   Lab Results   Component Value Date    WBC 6.2 04/09/2018    RBC 4.03 04/09/2018    RBC 4.30 03/18/2012    HGB 12.6 04/09/2018    HCT 36.8 04/09/2018    MCV 91 04/09/2018    MCH 31.3 04/09/2018    MCHC 34.2 04/09/2018    RDW 13.8 04/09/2018     04/09/2018    MPV 9.2 04/09/2018     BMP:    Lab Results   Component Value Date     06/14/2018    K 5.3 06/14/2018    K 4.2 04/09/2018     06/14/2018    CO2 29 06/14/2018    BUN 27 06/14/2018    CREATININE 1.1 06/14/2018    CALCIUM 9.5 06/14/2018    LABGLOM 73 04/09/2018    GLUCOSE 95 06/14/2018     BUN/Creatinine:    Lab Results   Component Value Date    BUN 27 06/14/2018    CREATININE 1.1 06/14/2018     Magnesium:  No results found for: MG  Phosphorus:  No results found for: PHOS  PT/INR:    Lab Results   Component Value Date    INR 0.89 04/09/2018     U/A:    Lab Results   Component Value Date    COLORU Yellow 07/16/2018    COLORU RED 04/09/2018    PHUR 6.5 04/09/2018    WBCUA  04/09/2018    RBCUA > 200 04/09/2018    YEAST NONE SEEN 04/09/2018    BACTERIA MANY 04/09/2018    SPECGRAV 1.025 04/09/2018    LEUKOCYTESUR MODERATE 04/09/2018    UROBILINOGEN 0.20 07/16/2018    BILIRUBINUR Negative 07/16/2018    BLOODU Small 07/16/2018    BLOODU LARGE 04/09/2018    GLUCOSEU Negative 07/16/2018       Results for POC orders placed in visit on 07/17/19   POCT Urinalysis No Micro (Auto)   Result Value Ref Range    Glucose, Ur Negative NEGATIVE mg/dl    Bilirubin Urine Negative Ketones, Urine Negative NEGATIVE    Specific Gravity, Urine 1.010 1.002 - 1.03    Blood, UA POC Negative NEGATIVE    pH, Urine 5.50 5.0 - 9.0    Protein, Urine Negative NEGATIVE mg/dl    Urobilinogen, Urine 0.20 0.0 - 1.0 eu/dl    Nitrite, Urine Negative NEGATIVE    Leukocyte Clumps, Urine Negative NEGATIVE    Color, Urine Yellow YELLOW-STR    Character, Urine Clear [id number]         Assessment & Plan:      BPH  Right Renal Cysts  History of Gross Hematuria    UA negative for blood today. We will send urine cytology due to history of gross hematuria and tobacco abuse. Follow-up in 2 years to send cytology.         Electronically signed by GUSTAVO Almanzar CNP on 7/16/2019 at 5:07 PM

## 2019-07-17 ENCOUNTER — OFFICE VISIT (OUTPATIENT)
Dept: UROLOGY | Age: 79
End: 2019-07-17
Payer: MEDICARE

## 2019-07-17 VITALS
BODY MASS INDEX: 24.48 KG/M2 | HEIGHT: 70 IN | SYSTOLIC BLOOD PRESSURE: 136 MMHG | WEIGHT: 171 LBS | DIASTOLIC BLOOD PRESSURE: 60 MMHG

## 2019-07-17 DIAGNOSIS — R31.0 GROSS HEMATURIA: Primary | ICD-10-CM

## 2019-07-17 DIAGNOSIS — Z87.891 HISTORY OF TOBACCO ABUSE: ICD-10-CM

## 2019-07-17 DIAGNOSIS — Q61.00 CONGENITAL RENAL CYST OF RIGHT KIDNEY: ICD-10-CM

## 2019-07-17 DIAGNOSIS — R35.0 BENIGN PROSTATIC HYPERPLASIA WITH URINARY FREQUENCY: ICD-10-CM

## 2019-07-17 DIAGNOSIS — N40.1 BENIGN PROSTATIC HYPERPLASIA WITH URINARY FREQUENCY: ICD-10-CM

## 2019-07-17 LAB
BILIRUBIN URINE: NEGATIVE
BLOOD URINE, POC: NEGATIVE
CHARACTER, URINE: CLEAR
COLOR, URINE: YELLOW
GLUCOSE URINE: NEGATIVE MG/DL
KETONES, URINE: NEGATIVE
LEUKOCYTE CLUMPS, URINE: NEGATIVE
NITRITE, URINE: NEGATIVE
PH, URINE: 5.5 (ref 5–9)
PROTEIN, URINE: NEGATIVE MG/DL
SPECIFIC GRAVITY, URINE: 1.01 (ref 1–1.03)
UROBILINOGEN, URINE: 0.2 EU/DL (ref 0–1)

## 2019-07-17 PROCEDURE — G8427 DOCREV CUR MEDS BY ELIG CLIN: HCPCS | Performed by: NURSE PRACTITIONER

## 2019-07-17 PROCEDURE — 1036F TOBACCO NON-USER: CPT | Performed by: NURSE PRACTITIONER

## 2019-07-17 PROCEDURE — 1123F ACP DISCUSS/DSCN MKR DOCD: CPT | Performed by: NURSE PRACTITIONER

## 2019-07-17 PROCEDURE — G8598 ASA/ANTIPLAT THER USED: HCPCS | Performed by: NURSE PRACTITIONER

## 2019-07-17 PROCEDURE — 99213 OFFICE O/P EST LOW 20 MIN: CPT | Performed by: NURSE PRACTITIONER

## 2019-07-17 PROCEDURE — 81003 URINALYSIS AUTO W/O SCOPE: CPT | Performed by: NURSE PRACTITIONER

## 2019-07-17 PROCEDURE — 4040F PNEUMOC VAC/ADMIN/RCVD: CPT | Performed by: NURSE PRACTITIONER

## 2019-07-17 PROCEDURE — G8420 CALC BMI NORM PARAMETERS: HCPCS | Performed by: NURSE PRACTITIONER

## 2019-12-11 ENCOUNTER — OFFICE VISIT (OUTPATIENT)
Dept: INTERNAL MEDICINE CLINIC | Age: 79
End: 2019-12-11
Payer: MEDICARE

## 2019-12-11 VITALS
HEIGHT: 71 IN | HEART RATE: 61 BPM | DIASTOLIC BLOOD PRESSURE: 64 MMHG | BODY MASS INDEX: 24.81 KG/M2 | SYSTOLIC BLOOD PRESSURE: 137 MMHG | WEIGHT: 177.25 LBS

## 2019-12-11 DIAGNOSIS — E11.9 TYPE 2 DIABETES MELLITUS WITHOUT COMPLICATION, WITHOUT LONG-TERM CURRENT USE OF INSULIN (HCC): Primary | ICD-10-CM

## 2019-12-11 DIAGNOSIS — E78.5 HYPERLIPIDEMIA, UNSPECIFIED HYPERLIPIDEMIA TYPE: ICD-10-CM

## 2019-12-11 DIAGNOSIS — I10 ESSENTIAL HYPERTENSION: ICD-10-CM

## 2019-12-11 DIAGNOSIS — I25.10 ASCVD (ARTERIOSCLEROTIC CARDIOVASCULAR DISEASE): ICD-10-CM

## 2019-12-11 LAB — HBA1C MFR BLD: 4.8 % (ref 4.3–5.7)

## 2019-12-11 PROCEDURE — G8484 FLU IMMUNIZE NO ADMIN: HCPCS | Performed by: INTERNAL MEDICINE

## 2019-12-11 PROCEDURE — G8427 DOCREV CUR MEDS BY ELIG CLIN: HCPCS | Performed by: INTERNAL MEDICINE

## 2019-12-11 PROCEDURE — 1123F ACP DISCUSS/DSCN MKR DOCD: CPT | Performed by: INTERNAL MEDICINE

## 2019-12-11 PROCEDURE — G8598 ASA/ANTIPLAT THER USED: HCPCS | Performed by: INTERNAL MEDICINE

## 2019-12-11 PROCEDURE — 99214 OFFICE O/P EST MOD 30 MIN: CPT | Performed by: INTERNAL MEDICINE

## 2019-12-11 PROCEDURE — G8420 CALC BMI NORM PARAMETERS: HCPCS | Performed by: INTERNAL MEDICINE

## 2019-12-11 PROCEDURE — 93000 ELECTROCARDIOGRAM COMPLETE: CPT | Performed by: INTERNAL MEDICINE

## 2019-12-11 PROCEDURE — 4040F PNEUMOC VAC/ADMIN/RCVD: CPT | Performed by: INTERNAL MEDICINE

## 2019-12-11 PROCEDURE — 83036 HEMOGLOBIN GLYCOSYLATED A1C: CPT | Performed by: INTERNAL MEDICINE

## 2019-12-11 PROCEDURE — 1036F TOBACCO NON-USER: CPT | Performed by: INTERNAL MEDICINE

## 2020-07-16 ENCOUNTER — OFFICE VISIT (OUTPATIENT)
Dept: INTERNAL MEDICINE CLINIC | Age: 80
End: 2020-07-16
Payer: MEDICARE

## 2020-07-16 VITALS
BODY MASS INDEX: 26.46 KG/M2 | DIASTOLIC BLOOD PRESSURE: 67 MMHG | SYSTOLIC BLOOD PRESSURE: 139 MMHG | HEIGHT: 71 IN | HEART RATE: 58 BPM | WEIGHT: 189 LBS | TEMPERATURE: 97.7 F

## 2020-07-16 LAB — HBA1C MFR BLD: 7.5 % (ref 4.3–5.7)

## 2020-07-16 PROCEDURE — 99214 OFFICE O/P EST MOD 30 MIN: CPT | Performed by: NURSE PRACTITIONER

## 2020-07-16 PROCEDURE — 1036F TOBACCO NON-USER: CPT | Performed by: NURSE PRACTITIONER

## 2020-07-16 PROCEDURE — 93000 ELECTROCARDIOGRAM COMPLETE: CPT | Performed by: NURSE PRACTITIONER

## 2020-07-16 PROCEDURE — 1123F ACP DISCUSS/DSCN MKR DOCD: CPT | Performed by: NURSE PRACTITIONER

## 2020-07-16 PROCEDURE — 3051F HG A1C>EQUAL 7.0%<8.0%: CPT | Performed by: NURSE PRACTITIONER

## 2020-07-16 PROCEDURE — G8427 DOCREV CUR MEDS BY ELIG CLIN: HCPCS | Performed by: NURSE PRACTITIONER

## 2020-07-16 PROCEDURE — 4040F PNEUMOC VAC/ADMIN/RCVD: CPT | Performed by: NURSE PRACTITIONER

## 2020-07-16 PROCEDURE — 83036 HEMOGLOBIN GLYCOSYLATED A1C: CPT | Performed by: NURSE PRACTITIONER

## 2020-07-16 PROCEDURE — G8417 CALC BMI ABV UP PARAM F/U: HCPCS | Performed by: NURSE PRACTITIONER

## 2020-07-16 ASSESSMENT — PATIENT HEALTH QUESTIONNAIRE - PHQ9
SUM OF ALL RESPONSES TO PHQ QUESTIONS 1-9: 0
SUM OF ALL RESPONSES TO PHQ9 QUESTIONS 1 & 2: 0
2. FEELING DOWN, DEPRESSED OR HOPELESS: 0
SUM OF ALL RESPONSES TO PHQ QUESTIONS 1-9: 0
1. LITTLE INTEREST OR PLEASURE IN DOING THINGS: 0

## 2020-07-16 NOTE — PROGRESS NOTES
[address] [facility] [address] INTERNAL MEDICINE  [address]  [facility] [address]  Dept: [phone]  Dept Fax: [phone]  Loc: [phone]     Visit Date:  7/16/2020    Patient:  Michael Kendall  YOB: 1940    HPI:     Chief Complaint   Patient presents with    Coronary Artery Disease    Diabetes    Hypertension    Hyperlipidemia       PCP Dr. Kasie Flood. Last saw Dr. Mauricio Hunt 12/11/19    A1C 7.5%. He has been eating better, more potatoes, not getting to the gym to work, due to [place]. His Metformin was stopped 12/11/19 due to A1C 4.8%. On Lisinopril 5 mg daily, last MACR 8 1/3/2017. HLD - On Lipitor 80 mg daily. Last lipids 2018, with LDL 50, HDL 40. Due for recheck. HTN  - BP on Metoprolol 25 mg BID, lisinopril 5 mg daily. CAD - No chest pain, SOB, WEI. Last cath 4/3/2008 with Dr. 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 Medications    Current Outpatient Medications:     cyanocobalamin 1000 MCG/ML injection, Inject 1,000 mcg into the muscle every 30 days, Disp: , Rfl:     lisinopril (PRINIVIL;ZESTRIL) 2.5 MG tablet, Take 5 mg by mouth daily , Disp: , Rfl:     Glucose Blood (BLOOD GLUCOSE TEST STRIPS) STRP, Test Blood Sugars once daily. Accu-Check test strips. Dx E11.9, Disp: 100 strip, Rfl: 3    metoprolol (LOPRESSOR) 50 MG tablet, Take 0.5 tablets by mouth 2 times daily, Disp: 60 tablet, Rfl: 1    metFORMIN (GLUCOPHAGE) 1000 MG tablet, Take 1 tablet by mouth 2 times daily (with meals), Disp: 180 tablet, Rfl: 3    nitroGLYCERIN (NITROSTAT) 0.4 MG SL tablet, Place 1 tablet under the tongue every 5 minutes as needed. , Disp: 25 tablet, Rfl: 5    Multiple Vitamin (MULTI-VITAMIN PO), Take  by mouth.  , Disp: , Rfl:     aspirin 81 MG tablet, Take 81 mg by mouth daily. , Disp: , Rfl:     atorvastatin (LIPITOR) 80 MG tablet, Take 80 mg by mouth daily.   , Disp: , Rfl:     The patient has No Known Allergies. Past Medical History  Torrie Lundborg  has a past medical history of CAD (coronary artery disease), Hyperlipidemia, Hypertension, Left ventricular dysfunction, and Type II or unspecified type diabetes mellitus without mention of complication, not stated as uncontrolled. Past Surgical History  The patient  has a past surgical history that includes Tonsillectomy (at the age of 13 yrs old) and Adenoidectomy (at the age of 13 yrs ). Family History  This patient's family history includes Prostate Cancer in his paternal uncle. Social History  Torrie Lundborg  reports that he quit smoking about 28 years ago. His smoking use included cigarettes. He has a 20.00 pack-year smoking history. He has never used smokeless tobacco. He reports that he does not drink alcohol or use drugs. Health Maintenance:    Health Maintenance   Topic Date Due    DTaP/Tdap/Td vaccine (1 - Tdap) 03/12/1961    Shingles Vaccine (1 of 2) 03/12/1992    Pneumococcal 65+ years Vaccine (1 of 1 - PPSV23) 03/12/2007    Annual Wellness Visit (AWV)  06/13/2019    Lipid screen  08/09/2019    Potassium monitoring  08/09/2019    Creatinine monitoring  08/09/2019    Flu vaccine (1) 09/01/2020    Hepatitis A vaccine  Aged Out    Hib vaccine  Aged Out    Meningococcal (ACWY) vaccine  Aged Out       Subjective:      Review of Systems   Constitutional: Negative for chills, fatigue and fever. HENT: Negative for sore throat and trouble swallowing. Eyes: Negative for itching and visual disturbance. Respiratory: Negative for cough, choking and shortness of breath. Cardiovascular: Negative for chest pain and leg swelling. Gastrointestinal: Negative for abdominal pain, constipation, diarrhea, nausea and vomiting. Endocrine: Negative for cold intolerance and heat intolerance. Genitourinary: Negative for difficulty urinating and dysuria. Musculoskeletal: Negative for arthralgias and myalgias.    Skin: Negative - see patient instructions. Discussed use, benefit, and side effects of prescribed medications. All patient questions answered. Pt voiced understanding.  .       Electronically signed GUSTAVO Olivarez CNP on 7/16/20 at 11:16 AM EDT

## 2020-07-16 NOTE — PATIENT INSTRUCTIONS
Check glucose once weekly fasting, and once weekly 2 hours after largest meal. Please call these into the office for advise. Increase attention to diet, exercise and water intake. Watch milk intake, carbs. Fasting labs December of this year. Patient Education        Learning About Diabetes Food Guidelines  Your Care Instructions     Meal planning is important to manage diabetes. It helps keep your blood sugar at a target level (which you set with your doctor). You don't have to eat special foods. You can eat what your family eats, including sweets once in a while. But you do have to pay attention to how often you eat and how much you eat of certain foods. You may want to work with a dietitian or a certified diabetes educator (CDE) to help you plan meals and snacks. A dietitian or CDE can also help you lose weight if that is one of your goals. What should you know about eating carbs? Managing the amount of carbohydrate (carbs) you eat is an important part of healthy meals when you have diabetes. Carbohydrate is found in many foods. · Learn which foods have carbs. And learn the amounts of carbs in different foods. ? Bread, cereal, pasta, and rice have about 15 grams of carbs in a serving. A serving is 1 slice of bread (1 ounce), ½ cup of cooked cereal, or 1/3 cup of cooked pasta or rice. ? Fruits have 15 grams of carbs in a serving. A serving is 1 small fresh fruit, such as an apple or orange; ½ of a banana; ½ cup of cooked or canned fruit; ½ cup of fruit juice; 1 cup of melon or raspberries; or 2 tablespoons of dried fruit. ? Milk and no-sugar-added yogurt have 15 grams of carbs in a serving. A serving is 1 cup of milk or 2/3 cup of no-sugar-added yogurt. ? Starchy vegetables have 15 grams of carbs in a serving. A serving is ½ cup of mashed potatoes or sweet potato; 1 cup winter squash; ½ of a small baked potato; ½ cup of cooked beans; or ½ cup cooked corn or green peas.   · Learn how much carbs to eat each day and at each meal. A dietitian or CDE can teach you how to keep track of the amount of carbs you eat. This is called carbohydrate counting. · If you are not sure how to count carbohydra  · te grams, use the Plate Method to plan meals. It is a good, quick way to make sure that you have a balanced meal. It also helps you spread carbs throughout the day. ? Divide your plate by types of foods. Put non-starchy vegetables on half the plate, meat or other protein food on one-quarter of the plate, and a grain or starchy vegetable in the final quarter of the plate. To this you can add a small piece of fruit and 1 cup of milk or yogurt, depending on how many carbs you are supposed to eat at a meal.  · Try to eat about the same amount of carbs at each meal. Do not \"save up\" your daily allowance of carbs to eat at one meal.  · Proteins have very little or no carbs per serving. Examples of proteins are beef, chicken, turkey, fish, eggs, tofu, cheese, cottage cheese, and peanut butter. A serving size of meat is 3 ounces, which is about the size of a deck of cards. Examples of meat substitute serving sizes (equal to 1 ounce of meat) are 1/4 cup of cottage cheese, 1 egg, 1 tablespoon of peanut butter, and ½ cup of tofu. How can you eat out and still eat healthy? · Learn to estimate the serving sizes of foods that have carbohydrate. If you measure food at home, it will be easier to estimate the amount in a serving of restaurant food. · If the meal you order has too much carbohydrate (such as potatoes, corn, or baked beans), ask to have a low-carbohydrate food instead. Ask for a salad or green vegetables. · If you use insulin, check your blood sugar before and after eating out to help you plan how much to eat in the future. · If you eat more carbohydrate at a meal than you had planned, take a walk or do other exercise. This will help lower your blood sugar. What else should you know?   · Limit saturated fat, such as the fat from meat and dairy products. This is a healthy choice because people who have diabetes are at higher risk of heart disease. So choose lean cuts of meat and nonfat or low-fat dairy products. Use olive or canola oil instead of butter or shortening when cooking. · Don't skip meals. Your blood sugar may drop too low if you skip meals and take insulin or certain medicines for diabetes. · Check with your doctor before you drink alcohol. Alcohol can cause your blood sugar to drop too low. Alcohol can also cause a bad reaction if you take certain diabetes medicines. Follow-up care is a key part of your treatment and safety. Be sure to make and go to all appointments, and call your doctor if you are having problems. It's also a good idea to know your test results and keep a list of the medicines you take. Where can you learn more? Go to [email or web address] org and sign in to your [employer] account. Enter P356 in the [employer] box to learn more about \"Learning About Diabetes Food Guidelines. \"     If you do not have an account, please click on the \"Sign Up Now\" link. Current as of: December 20, 2019               Content Version: 12.5  © 8278-3862 Healthwise, Incorporated. Care instructions adapted under license by [facility] ([facility]). If you have questions about a medical condition or this instruction, always ask your healthcare professional. [address]  any warranty or liability for your use of this information.

## 2020-07-27 ASSESSMENT — ENCOUNTER SYMPTOMS
TROUBLE SWALLOWING: 0
NAUSEA: 0
CHOKING: 0
SHORTNESS OF BREATH: 0
DIARRHEA: 0
EYE ITCHING: 0
VOMITING: 0
CONSTIPATION: 0
SORE THROAT: 0
COUGH: 0
ABDOMINAL PAIN: 0

## 2021-01-21 ENCOUNTER — OFFICE VISIT (OUTPATIENT)
Dept: INTERNAL MEDICINE CLINIC | Age: 81
End: 2021-01-21
Payer: MEDICARE

## 2021-01-21 VITALS
BODY MASS INDEX: 26.41 KG/M2 | SYSTOLIC BLOOD PRESSURE: 120 MMHG | WEIGHT: 188.63 LBS | HEART RATE: 62 BPM | DIASTOLIC BLOOD PRESSURE: 50 MMHG | TEMPERATURE: 96.7 F | HEIGHT: 71 IN

## 2021-01-21 DIAGNOSIS — E78.2 MIXED HYPERLIPIDEMIA: ICD-10-CM

## 2021-01-21 DIAGNOSIS — I10 ESSENTIAL HYPERTENSION: ICD-10-CM

## 2021-01-21 DIAGNOSIS — I25.10 ASCVD (ARTERIOSCLEROTIC CARDIOVASCULAR DISEASE): ICD-10-CM

## 2021-01-21 DIAGNOSIS — E11.9 TYPE 2 DIABETES MELLITUS WITHOUT COMPLICATION, WITHOUT LONG-TERM CURRENT USE OF INSULIN (HCC): Primary | ICD-10-CM

## 2021-01-21 PROCEDURE — 4040F PNEUMOC VAC/ADMIN/RCVD: CPT | Performed by: NURSE PRACTITIONER

## 2021-01-21 PROCEDURE — 99214 OFFICE O/P EST MOD 30 MIN: CPT | Performed by: NURSE PRACTITIONER

## 2021-01-21 PROCEDURE — G8427 DOCREV CUR MEDS BY ELIG CLIN: HCPCS | Performed by: NURSE PRACTITIONER

## 2021-01-21 PROCEDURE — 1036F TOBACCO NON-USER: CPT | Performed by: NURSE PRACTITIONER

## 2021-01-21 PROCEDURE — G8484 FLU IMMUNIZE NO ADMIN: HCPCS | Performed by: NURSE PRACTITIONER

## 2021-01-21 PROCEDURE — G8417 CALC BMI ABV UP PARAM F/U: HCPCS | Performed by: NURSE PRACTITIONER

## 2021-01-21 PROCEDURE — 1123F ACP DISCUSS/DSCN MKR DOCD: CPT | Performed by: NURSE PRACTITIONER

## 2021-01-21 ASSESSMENT — ENCOUNTER SYMPTOMS
DIARRHEA: 0
SORE THROAT: 0
ABDOMINAL PAIN: 0
SHORTNESS OF BREATH: 0
VOMITING: 0
NAUSEA: 0
TROUBLE SWALLOWING: 0
CONSTIPATION: 0
COUGH: 0

## 2021-01-21 NOTE — PATIENT INSTRUCTIONS
Recheck A1C, BMP and triglycerides on/after 2/24/2021. Fasting labs. Get new meter, check twice daily (fasting every day, and when checking in the evening, check 2 hours after the largest meal). Bring meter by for a download in about two weeks. Attention to diet, exercise and water intake to improve glucose. See me back in 6 weeks for follow up, call if problems, bring meter with your for repeat download.

## 2021-01-21 NOTE — PROGRESS NOTES
[facility]  [address] [address]  Dept: [phone]  Dept Fax: [phone]  Loc: [phone]     Visit Date:  1/21/2021    Patient:  Zachery Martins  YOB: 1940    HPI:     Chief Complaint   Patient presents with    Diabetes     6 month follow up    Coronary Artery Disease    Hypertension       DM-last A1c 8.7% on 11/24/2020 at the [place]. Creatinine 1.4, BUN 33. He is on Metformin at [facility] twice daily. Checks glucose twice weekly, running in 120's. Typically checks this fasting in am. Will run around 125 after supper. States he was running 170-180's a couple months ago. He also retired in March, then [place] happened and he states things got out of control. .  He states he got back on his diet. He was eating a lot of potato chips. His meter may not be accurate, he says that at times the glucose will read result before he even gets the strip placed in. On lisinopril 5 mg daily, statin and aspirin. Dilated eye exam and foot exams at [place]. HLD-cholesterol 195, HDL 49, LDL 52, triglycerides 411. On Lipitor 80 mg daily. Discussed etiology of hypertriglyceridemia and its relationship to carb intake and sugars. HTN-/50. ASCVD-on aspirin 81 mg daily, Lipitor 80 mg daily, metoprolol 25 mg twice daily. No chest pain, shortness of breath, orthopnea, dyspnea on exertion. He tolerates activity without difficulty. Last EKG from July 2020 reviewed, sinus bradycardia, no acute changes. Last cath 4/3/2008 with Dr. 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            Medications    Current Outpatient Medications:     Cyanocobalamin (VITAMIN B 12 PO), Take 1,000 mg by mouth daily, Disp: , Rfl:     vitamin D (CHOLECALCIFEROL) 25 MCG (1000 UT) TABS tablet, Take 1,000 Units by mouth daily, Disp: , Rfl:     blood glucose monitor kit and supplies, Check twice daily and as needed for symptoms of low/high blood glucose. Dispense sufficient amount for indicated testing frequency plus additional to accommodate PRN testing needs. Dispense all needed supplies to include: monitor, strips, lancing device, lancets, control solutions, alcohol swabs., Disp: 1 kit, Rfl: 0    lisinopril (PRINIVIL;ZESTRIL) 2.5 MG tablet, Take 5 mg by mouth daily , Disp: , Rfl:     Glucose Blood (BLOOD GLUCOSE TEST STRIPS) STRP, Test Blood Sugars once daily. Accu-Check test strips. Dx E11.9, Disp: 100 strip, Rfl: 3    metoprolol (LOPRESSOR) 50 MG tablet, Take 0.5 tablets by mouth 2 times daily, Disp: 60 tablet, Rfl: 1    metFORMIN (GLUCOPHAGE) 1000 MG tablet, Take 1 tablet by mouth 2 times daily (with meals), Disp: 180 tablet, Rfl: 3    nitroGLYCERIN (NITROSTAT) 0.4 MG SL tablet, Place 1 tablet under the tongue every 5 minutes as needed. , Disp: 25 tablet, Rfl: 5    Multiple Vitamin (MULTI-VITAMIN PO), Take  by mouth.  , Disp: , Rfl:     aspirin 81 MG tablet, Take 81 mg by mouth daily. , Disp: , Rfl:     atorvastatin (LIPITOR) 80 MG tablet, Take 80 mg by mouth daily. , Disp: , Rfl:     The patient has No Known Allergies. Past Medical History  Nitesh Davies  has a past medical history of CAD (coronary artery disease), Hyperlipidemia, Hypertension, Left ventricular dysfunction, and Type II or unspecified type diabetes mellitus without mention of complication, not stated as uncontrolled. Past Surgical History  The patient  has a past surgical history that includes Tonsillectomy (at the age of 13 yrs old) and Adenoidectomy (at the age of 13 yrs ). Family History  This patient's family history includes Prostate Cancer in his paternal uncle. Social History  Nitesh Davies  reports that he quit smoking about 29 years ago. His smoking use included cigarettes. He has a 20.00 pack-year smoking history.  He has never used smokeless tobacco. He reports that he does not drink alcohol or use drugs. Health Maintenance:    Health Maintenance   Topic Date Due    Hepatitis C screen  1940    DTaP/Tdap/Td vaccine (1 - Tdap) 03/12/1961    Shingles Vaccine (1 of 2) 03/12/1992    Pneumococcal 65+ years Vaccine (1 of 1 - PPSV23) 03/12/2007    Annual Wellness Visit (AWV)  06/13/2019    Flu vaccine (1) 09/01/2020    Lipid screen  11/24/2021    Potassium monitoring  11/24/2021    Creatinine monitoring  11/24/2021    Hepatitis A vaccine  Aged Out    Hib vaccine  Aged Out    Meningococcal (ACWY) vaccine  Aged Out       Subjective:      Review of Systems   Constitutional: Negative for chills, fatigue and fever. HENT: Negative for sore throat and trouble swallowing. Eyes: Negative for visual disturbance. Respiratory: Negative for cough and shortness of breath. Cardiovascular: Negative for chest pain and leg swelling. Gastrointestinal: Negative for abdominal pain, constipation, diarrhea, nausea and vomiting. Genitourinary: Negative for difficulty urinating. Musculoskeletal: Negative for arthralgias and myalgias. Skin: Negative for rash and wound. Neurological: Negative for numbness. Objective:     BP (!) 120/50 (Site: Left Upper Arm)   Pulse 62   Temp 96.7 °F (35.9 °C)   Ht 5' 10.98\" (1.803 m)   Wt 188 lb 10.1 oz (85.6 kg)   BMI 26.32 kg/m²     Physical Exam  Vitals signs reviewed. Constitutional:       General: He is not in acute distress. Appearance: He is well-developed. He is not diaphoretic. HENT:      Head: Normocephalic and atraumatic. Mouth/Throat:      Pharynx: No oropharyngeal exudate. Neck:      Musculoskeletal: Normal range of motion and neck supple. Thyroid: No thyromegaly. Cardiovascular:      Rate and Rhythm: Normal rate and regular rhythm. Heart sounds: Normal heart sounds. No murmur. No friction rub. No gallop. Pulmonary:      Effort: Pulmonary effort is normal. No respiratory distress.       Breath sounds: Normal breath sounds. No wheezing or rales. Abdominal:      General: There is no distension. Palpations: Abdomen is soft. Tenderness: There is no abdominal tenderness. Musculoskeletal: Normal range of motion. General: No tenderness. Lymphadenopathy:      Cervical: No cervical adenopathy. Skin:     General: Skin is warm and dry. Coloration: Skin is not pale. Findings: No erythema or rash. Neurological:      Mental Status: He is alert and oriented to person, place, and time. Labs Reviewed 1/21/2021:    Lab Results   Component Value Date    WBC 6.2 04/09/2018    HGB 12.6 (L) 04/09/2018    HCT 36.8 (L) 04/09/2018     04/09/2018    CHOL 132 08/09/2018    TRIG 208 08/09/2018    HDL 40 08/09/2018    LDLDIRECT 72 06/14/2018    ALT 10 06/14/2018    AST 42 (H) 04/09/2018     06/14/2018    K 5.3 06/14/2018     06/14/2018    CREATININE 1.1 06/14/2018    BUN 27 (H) 06/14/2018    CO2 29 06/14/2018    INR 0.89 04/09/2018    LABA1C 7.5 (H) 07/16/2020    LABMICR 1.44 01/03/2017       Assessment/Plan      1. Type 2 diabetes mellitus without complication, without long-term current use of insulin (HCC)  Last A1c 8.7% in November 2020, up from 7.5% in July 2020. I reviewed labs from [place]. I am missing the MACR. We will call and get this. Advised to continue increased attention to diet, exercise and water intake to improve glucose results. Only on Metformin 1000 mg twice daily at this time. He reports his sugars are running around 120s, but he also reports that his meter may be malfunctioning. Therefore we will get him a new meter and instructed him to check his glucose twice daily for the next 2 weeks, fasting in the morning and 2 hours after the largest meal of the day. Bring meter for download in 2 weeks. If his glucoses are in fact elevated, will likely look at starting SGLT2 due to his history of cardiac disease.   We discussed this, and the need to stay hydrated and monitoring of kidney function if this medication were started. We will recheck A1c, BMP and fasting triglycerides on or after 2/24/2021. Meet back here in 6 weeks to review results. We will perform a foot exam at that visit. - Hemoglobin A1C; Future  - Basic Metabolic Panel; Future  - Triglycerides; Future  - blood glucose monitor kit and supplies; Check twice daily and as needed for symptoms of low/high blood glucose. Dispense sufficient amount for indicated testing frequency plus additional to accommodate PRN testing needs. Dispense all needed supplies to include: monitor, strips, lancing device, lancets, control solutions, alcohol swabs. Dispense: 1 kit; Refill: 0    2. Mixed hyperlipidemia  On Lipitor 80 mg. Hepatic function stable. We will recheck triglycerides with next labs. - Triglycerides; Future    3. ASCVD (arteriosclerotic cardiovascular disease)  No signs or symptoms of cardiac decompensation. On appropriate medical therapy at this time. EKG at next visit. 4. Essential hypertension  Blood pressure stable on current medications. EKG at next visit. Return in about 6 weeks (around 3/4/2021). Patient given educational materials - see patient instructions. Discussed use, benefit, and side effects of prescribed medications. All patient questions answered. Pt voiced understanding.        Electronically signed GUSTAVO Tiwari CNP on 1/21/21 at 9:46 AM EST

## 2021-02-05 ENCOUNTER — IMMUNIZATION (OUTPATIENT)
Dept: PRIMARY CARE CLINIC | Age: 81
End: 2021-02-05
Payer: MEDICARE

## 2021-02-05 PROCEDURE — 0011A COVID-19, MODERNA VACCINE 100MCG/0.5ML DOSE: CPT | Performed by: FAMILY MEDICINE

## 2021-02-05 PROCEDURE — 91301 COVID-19, MODERNA VACCINE 100MCG/0.5ML DOSE: CPT | Performed by: FAMILY MEDICINE

## 2021-03-04 ENCOUNTER — OFFICE VISIT (OUTPATIENT)
Dept: INTERNAL MEDICINE CLINIC | Age: 81
End: 2021-03-04
Payer: MEDICARE

## 2021-03-04 ENCOUNTER — NURSE ONLY (OUTPATIENT)
Dept: LAB | Age: 81
End: 2021-03-04

## 2021-03-04 VITALS
DIASTOLIC BLOOD PRESSURE: 81 MMHG | WEIGHT: 184 LBS | TEMPERATURE: 96.4 F | HEART RATE: 69 BPM | BODY MASS INDEX: 25.76 KG/M2 | HEIGHT: 71 IN | SYSTOLIC BLOOD PRESSURE: 126 MMHG | RESPIRATION RATE: 17 BRPM

## 2021-03-04 DIAGNOSIS — I10 ESSENTIAL HYPERTENSION: ICD-10-CM

## 2021-03-04 DIAGNOSIS — E11.9 TYPE 2 DIABETES MELLITUS WITHOUT COMPLICATION, WITHOUT LONG-TERM CURRENT USE OF INSULIN (HCC): Primary | ICD-10-CM

## 2021-03-04 DIAGNOSIS — E11.9 TYPE 2 DIABETES MELLITUS WITHOUT COMPLICATION, WITHOUT LONG-TERM CURRENT USE OF INSULIN (HCC): ICD-10-CM

## 2021-03-04 DIAGNOSIS — E78.2 MIXED HYPERLIPIDEMIA: ICD-10-CM

## 2021-03-04 LAB
ANION GAP SERPL CALCULATED.3IONS-SCNC: 7 MEQ/L (ref 8–16)
BUN BLDV-MCNC: 28 MG/DL (ref 7–22)
C-PEPTIDE: 4.7 NG/ML (ref 1.1–4.4)
CALCIUM SERPL-MCNC: 10.2 MG/DL (ref 8.5–10.5)
CHLORIDE BLD-SCNC: 101 MEQ/L (ref 98–111)
CO2: 30 MEQ/L (ref 23–33)
CREAT SERPL-MCNC: 1.2 MG/DL (ref 0.4–1.2)
GFR SERPL CREATININE-BSD FRML MDRD: 58 ML/MIN/1.73M2
GLUCOSE BLD-MCNC: 215 MG/DL (ref 70–108)
HBA1C MFR BLD: 9.3 % (ref 4.3–5.7)
POTASSIUM SERPL-SCNC: 4.7 MEQ/L (ref 3.5–5.2)
SODIUM BLD-SCNC: 138 MEQ/L (ref 135–145)

## 2021-03-04 PROCEDURE — 1036F TOBACCO NON-USER: CPT | Performed by: NURSE PRACTITIONER

## 2021-03-04 PROCEDURE — 4040F PNEUMOC VAC/ADMIN/RCVD: CPT | Performed by: NURSE PRACTITIONER

## 2021-03-04 PROCEDURE — 1123F ACP DISCUSS/DSCN MKR DOCD: CPT | Performed by: NURSE PRACTITIONER

## 2021-03-04 PROCEDURE — 99214 OFFICE O/P EST MOD 30 MIN: CPT | Performed by: NURSE PRACTITIONER

## 2021-03-04 PROCEDURE — G8417 CALC BMI ABV UP PARAM F/U: HCPCS | Performed by: NURSE PRACTITIONER

## 2021-03-04 PROCEDURE — G8484 FLU IMMUNIZE NO ADMIN: HCPCS | Performed by: NURSE PRACTITIONER

## 2021-03-04 PROCEDURE — 83036 HEMOGLOBIN GLYCOSYLATED A1C: CPT | Performed by: NURSE PRACTITIONER

## 2021-03-04 PROCEDURE — G8427 DOCREV CUR MEDS BY ELIG CLIN: HCPCS | Performed by: NURSE PRACTITIONER

## 2021-03-04 ASSESSMENT — ENCOUNTER SYMPTOMS
VOMITING: 0
SHORTNESS OF BREATH: 0
TROUBLE SWALLOWING: 0
ABDOMINAL PAIN: 0
COUGH: 0
CONSTIPATION: 0
NAUSEA: 0
DIARRHEA: 0
SORE THROAT: 0

## 2021-03-04 NOTE — PROGRESS NOTES
[facility]  [address] [facility] [address]  Dept: [phone]  Dept Fax: [phone]: [phone]     Visit Date:  3/4/2021    Patient:  Bill Ortega  YOB: 1940    HPI:     Chief Complaint   Patient presents with    Diabetes     TYPE 2 -  6 WEEK FU    Hyperlipidemia       HPI    PCP-Dr. Jairo Saavedra    I last saw him 1/21/2021 as follows:   DM-last A1c 8.7% on 11/24/2020 at the [facility] Creatinine 1.4, BUN 33. He is on Metformin 1000 mg twice daily. Checks glucose twice weekly, running in 120's. Typically checks this fasting in am. Will run around 125 after supper. States he was running 170-180's a couple months ago. He also retired in March, then [place] happened and he states things got out of control. .  He states he got back on his diet. He was eating a lot of potato chips. His meter may not be accurate, he says that at times the glucose will read result before he even gets the strip placed in. On lisinopril 5 mg daily, statin and aspirin.     Dilated eye exam and foot exams at [facility].         HLD-cholesterol 195, HDL 49, LDL 52, triglycerides 411. On Lipitor 80 mg daily. Discussed etiology of hypertriglyceridemia and its relationship to carb intake and sugars.     HTN-/50.      ASCVD-on aspirin 81 mg daily, Lipitor 80 mg daily, metoprolol 25 mg twice daily. No chest pain, shortness of breath, orthopnea, dyspnea on exertion. He tolerates activity without difficulty. Last EKG from July 2020 reviewed, sinus bradycardia, no acute changes. Last cath 4/3/2008 with Dr. 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 Today's visit:  DM - Last A1c 8.7 11/24/2020. A1c today 9.7%  I sent a new meter at the last visit. Here today to go over glucose results more in detail with the new meter.  These results are [id number], only a few results on the download over the past few days. He states he has been lax with his diet. He was previously on insulin [id number], this was discontinued as his glucose improved. Denies polyuria or polydipsia  No numbness or tingling in his feet. On lisinopril 5 mg daily, aspirin, Lipitor. HTN - /81. HLD-on Lipitor 80 mg daily. Medications    Current Outpatient Medications:     Cyanocobalamin (VITAMIN B 12 PO), Take 1,000 mg by mouth daily, Disp: , Rfl:     vitamin D (CHOLECALCIFEROL) 25 MCG (1000 UT) TABS tablet, Take 1,000 Units by mouth daily, Disp: , Rfl:     blood glucose monitor kit and supplies, Check twice daily and as needed for symptoms of low/high blood glucose. Dispense sufficient amount for indicated testing frequency plus additional to accommodate PRN testing needs. Dispense all needed supplies to include: monitor, strips, lancing device, lancets, control solutions, alcohol swabs., Disp: 1 kit, Rfl: 0    lisinopril (PRINIVIL;ZESTRIL) 2.5 MG tablet, Take 5 mg by mouth daily , Disp: , Rfl:     Glucose Blood (BLOOD GLUCOSE TEST STRIPS) STRP, Test Blood Sugars once daily. Accu-Check test strips. Dx E11.9, Disp: 100 strip, Rfl: 3    metoprolol (LOPRESSOR) 50 MG tablet, Take 0.5 tablets by mouth 2 times daily, Disp: 60 tablet, Rfl: 1    metFORMIN (GLUCOPHAGE) 1000 MG tablet, Take 1 tablet by mouth 2 times daily (with meals), Disp: 180 tablet, Rfl: 3    nitroGLYCERIN (NITROSTAT) 0.4 MG SL tablet, Place 1 tablet under the tongue every 5 minutes as needed. , Disp: 25 tablet, Rfl: 5    Multiple Vitamin (MULTI-VITAMIN PO), Take  by mouth.  , Disp: , Rfl:     aspirin 81 MG tablet, Take 81 mg by mouth daily. , Disp: , Rfl:     atorvastatin (LIPITOR) 80 MG tablet, Take 80 mg by mouth daily. , Disp: , Rfl:     The patient has No Known Allergies.     Past Medical History  [place] Mom  has a past medical history of CAD (coronary artery disease), Hyperlipidemia, Hypertension, Left ventricular dysfunction, and Type II 11\" (1.803 m)   Wt 184 lb (83.5 kg)   BMI 25.66 kg/m²     Physical Exam  Vitals signs reviewed. Constitutional:       General: He is not in acute distress. Appearance: He is well-developed. He is not diaphoretic. HENT:      Head: Normocephalic and atraumatic. Mouth/Throat:      Pharynx: No oropharyngeal exudate. Neck:      Musculoskeletal: Normal range of motion and neck supple. Thyroid: No thyromegaly. Cardiovascular:      Rate and Rhythm: Normal rate and regular rhythm. Heart sounds: Normal heart sounds. No murmur. No friction rub. No gallop. Pulmonary:      Effort: Pulmonary effort is normal. No respiratory distress. Breath sounds: Normal breath sounds. No wheezing or rales. Abdominal:      General: There is no distension. Palpations: Abdomen is soft. Tenderness: There is no abdominal tenderness. Musculoskeletal: Normal range of motion. General: No tenderness. Lymphadenopathy:      Cervical: No cervical adenopathy. Skin:     General: Skin is warm and dry. Coloration: Skin is not pale. Findings: No erythema or rash. Neurological:      Mental Status: He is alert and oriented to person, place, and time. DIABETIC FOOT EXAM  Visual inspection:  Deformity/amputation: absent  Skin lesions/pre-ulcerative calluses: absent  Edema: right- negative, left- negative    Sensory exam:  Monofilament sensation: normal  (minimum of 5 random plantar locations tested, avoiding callused areas - > 1 area with absence of sensation is + for neuropathy)    Plus at least one of the following:  Pulses: normal,   Pinprick: N/A  Proprioception: N/A  Vibration (128 Hz):  Intact    Labs Reviewed 3/4/2021:    Lab Results   Component Value Date    WBC 6.2 04/09/2018    HGB 12.6 (L) 04/09/2018    HCT 36.8 (L) 04/09/2018     04/09/2018    CHOL 132 08/09/2018    TRIG 208 08/09/2018    HDL 40 08/09/2018    LDLDIRECT 72 06/14/2018    ALT 10 06/14/2018    AST 42 (H) 04/09/2018     06/14/2018    K 5.3 06/14/2018     06/14/2018    CREATININE 1.1 06/14/2018    BUN 27 (H) 06/14/2018    CO2 29 06/14/2018    INR 0.89 04/09/2018    LABA1C 9.3 (H) 03/04/2021    LABMICR 1.44 01/03/2017       Assessment/Plan      1. Type 2 diabetes mellitus without complication, without long-term current use of insulin ([facility])  A1C up to 9.7%, with glucose running 200's. Discussed, patient is interested in GLP-1 therapy. Need a serum C-peptide prior to initiation, and we will recheck his kidney function as he has what appears to be CKD 3. Trulicity or Victoza would be preferred in patients with CKD, no renal adjustment for Trulicity. Patient educated about possible side effects of Trulicity including GI, pancreatitis, he reports no family history of thyroid carcinoma or MEN. Advised of adverse effects and to report these if occurring including abdominal pain, vomiting, and medication education placed on chart for discharge. If C-peptide is appropriate insulin activity and kidney function is stable, would initiate Trulicity 9.46 mg weekly. We will schedule nurse visit for him to come in to get his first injection. He will report glucose logs in two weeks, either by meter download or by phone. BMP in 4 weeks after initiation of GLP1. See me back in 6 weeks  Offered dietician/educator referral. He declines. Patient education given about carbohydrate portion control, diabetic diet concerns, avoidance of excessive intake of concentrated carbs and sweets. Verbalizes understanding.    - POCT glycosylated hemoglobin (Hb A1C)  - Basic Metabolic Panel; Future  - C-Peptide, Serum; Future  -  DIABETES FOOT EXAM    2. Essential hypertension  BP stable. 3. Mixed hyperlipidemia  On statin therapy      Return in about 6 weeks (around 4/15/2021) for Diabetes. Patient given educational materials - see patient instructions.   Discussed use, benefit, and side effects of prescribed medications. All patient questions answered. Pt voiced understanding.       Electronically signed GUSTAVO Wright CNP on 3/4/21 at 9:34 AM EST

## 2021-03-04 NOTE — PATIENT INSTRUCTIONS
Get labs today to check kidney function and pancreas insulin production. If ok - will plan Trulicity 6.18 mg weekly for four weeks then recheck kidney function - blood work at that time. Check glucose fasting in am daily and occasionally 2 hours after the largest meal of the day. Meter download or call in sugars in two weeks. Meet with dietician and diabetic educator if you have any concerns. Patient Education        dulaglutide  Pronunciation:  DOO la YESSENIA tide  Brand:  Trulicity Pen  What is the most important information I should know about dulaglutide? You should not use dulaglutide if you have Multiple Endocrine Neoplasia syndrome type 2 (MEN 2), or a personal or family history of medullary thyroid carcinoma (a type of thyroid cancer). Do not use dulaglutide if you are in a state of diabetic ketoacidosis (call your doctor for treatment). In animal studies, dulaglutide caused thyroid tumors or thyroid cancer. It is not known whether these effects would occur in people using regular doses. Ask your doctor about your risk. Call your doctor at once if you have signs of a thyroid tumor, such as swelling or a lump in your neck, trouble swallowing, a hoarse voice, or shortness of breath. What is dulaglutide? Dulaglutide is used together with diet and exercise to improve blood sugar control in adults with type 2 diabetes mellitus. Dulaglutide is usually given after other diabetes medicines have been tried without success. This medicine is not for treating type 1 diabetes. Dulaglutide may also be used for purposes not listed in this medication guide. What should I discuss with my healthcare provider before using dulaglutide?   You should not use dulaglutide if you are allergic to it, or if you have:  · multiple endocrine neoplasia type 2 (tumors in your glands);  · a personal or family history of medullary thyroid carcinoma (a type of thyroid cancer); or  · diabetic ketoacidosis (call your doctor for treatment). Tell your doctor if you have ever had:  · pancreatitis;  · a stomach or intestinal disorder;  · gastroesophageal reflux disease (GERD) or slow digestion;  · liver or kidney disease;  · if you also use insulin or oral diabetes medicine; or  · if you have been sick with vomiting or diarrhea. In animal studies, dulaglutide caused thyroid tumors or thyroid cancer. It is not known whether these effects would occur in people using regular doses. Ask your doctor about your risk. It is not known whether this medicine will harm an unborn baby. Tell your doctor if you are pregnant or plan to become pregnant. It may not be safe to breast-feed while using this medicine. Ask your doctor about any risk. Dulaglutide is not approved for use by anyone younger than 25years old. How should I use dulaglutide? Follow all directions on your prescription label and read all medication guides or instruction sheets. Use the medicine exactly as directed. Dulaglutide is injected under the skin once per week. Use dulaglutide on the same day each week at the same time of day. If you change your dosing day, allow at least 3 days to pass between doses. You may use dulaglutide with or without food. Read and carefully follow any Instructions for Use provided with your medicine. Ask your doctor or pharmacist if you do not understand these instructions. Your healthcare provider will show you where on your body to inject dulaglutide. Use a different place each time you give an injection. Do not inject into the same place two times in a row. You may have low blood sugar (hypoglycemia) and feel very hungry, dizzy, irritable, confused, anxious, or shaky. To quickly treat hypoglycemia, eat or drink a fast-acting source of sugar (fruit juice, hard candy, crackers, raisins, or non-diet soda). Your doctor may prescribe a glucagon injection kit in case you have severe hypoglycemia.  Be sure your family or close friends know how to give you this injection in an emergency. Also watch for signs of high blood sugar (hyperglycemia) such as increased thirst or urination. Blood sugar levels can be affected by stress, illness, surgery, exercise, alcohol use, or skipping meals. Ask your doctor before changing your dose or medication schedule. Each injection pen or prefilled syringe is for one use only. Throw away after one use, even if there is still medicine left inside. Use a puncture-proof \"sharps\" container. Follow state or local laws about how to dispose of this container. Keep it out of the reach of children and pets. Store dulaglutide in the refrigerator, protected from light. Do not use past the expiration date on the medicine label. Do not freeze dulaglutide, and throw away the medicine if it has become frozen. You may also store dulaglutide at room temperature for up to 14 days before use. What happens if I miss a dose? Use the medicine as soon as you can, but skip the missed dose if your next dose is due in less than 3 days. Do not use two doses at one time. Do not use this medicine twice within a 72-hour period. What happens if I overdose? Seek emergency medical attention or call the Poison Help line at 1-342.729.5967. What should I avoid while using dulaglutide? Never share an injection pen or prefilled syringe with another person, even if the needle has been changed. Sharing these devices can allow infections or disease to pass from one person to another. What are the possible side effects of dulaglutide? Stop using dulaglutide and get emergency medical help if you have signs of an allergic reaction: hives; difficult breathing; feeling light-headed; swelling of your face, lips, tongue, or throat.   Call your doctor at once if you have:  · pancreatitis --severe pain in your upper stomach spreading to your back, nausea and vomiting;  · signs of a thyroid tumor --swelling or a lump in your neck, trouble swallowing, a hoarse voice, or if you feel short of breath;  · low blood sugar --headache, hunger, weakness, sweating, confusion, irritability, dizziness, fast heart rate, or feeling jittery; or  · kidney problems --little or no urination, swelling in your feet or ankles, feeling tired or short of breath. Tell your doctor if you are sick with vomiting or diarrhea, or if you are sweating more than usual. You can easily become dehydrated while using dulaglutide. Common side effects may include:  · nausea, vomiting, stomach pain;  · diarrhea; or  · loss of appetite. This is not a complete list of side effects and others may occur. Call your doctor for medical advice about side effects. You may report side effects to FDA at 8-832-FDA-0142. What other drugs will affect dulaglutide? Dulaglutide can slow your digestion, and it may take longer for your body to absorb any medicines you take by mouth. Other drugs may affect dulaglutide, including prescription and over-the-counter medicines, vitamins, and herbal products. Tell your doctor about all your current medicines and any medicine you start or stop using. Where can I get more information? Your pharmacist can provide more information about dulaglutide. Remember, keep this and all other medicines out of the reach of children, never share your medicines with others, and use this medication only for the indication prescribed. Every effort has been made to ensure that the information provided by [address] is accurate, up-to-date, and complete, but no guarantee is made to that effect. Drug information contained herein may be time sensitive. [facility] information has been compiled for use by healthcare practitioners and consumers in the [place] and therefore [facility] does not warrant that uses outside of the [place] are appropriate, unless specifically indicated otherwise.  [facility] information does not endorse drugs, diagnose patients or recommend therapy. [facility] drug information is an informational resource designed to assist licensed healthcare practitioners in caring for their patients and/or to serve consumers viewing this service as a supplement to, and not a substitute for, the expertise, skill, knowledge and judgment of healthcare practitioners. The absence of a warning for a given drug or drug combination in no way should be construed to indicate that the drug or drug combination is safe, effective or appropriate for any given patient. [facility] does not assume any responsibility for any aspect of healthcare administered with the aid of information [facility] provides. The information contained herein is not intended to cover all possible uses, directions, precautions, warnings, drug interactions, allergic reactions, or adverse effects. If you have questions about the drugs you are taking, check with your doctor, nurse or pharmacist.  Copyright 3375-3720 [address] [place]. Version: 3.01. Revision date: 5/14/2020. Care instructions adapted under license by [facility] ([facility]). If you have questions about a medical condition or this instruction, always ask your healthcare professional. [place] [address] any warranty or liability for your use of this information. Patient Education        Learning About Diabetes Food Guidelines  Your Care Instructions     Meal planning is important to manage diabetes. It helps keep your blood sugar at a target level (which you set with your doctor). You don't have to eat special foods. You can eat what your family eats, including sweets once in a while. But you do have to pay attention to how often you eat and how much you eat of certain foods. You may want to work with a dietitian or a certified diabetes educator (CDE) to help you plan meals and snacks. A dietitian or CDE can also help you lose weight if that is one of your goals. What should you know about eating carbs?   Managing the amount of carbohydrate (carbs) you eat is an important part of healthy meals when you have diabetes. Carbohydrate is found in many foods. · Learn which foods have carbs. And learn the amounts of carbs in different foods. ? Bread, cereal, pasta, and rice have about 15 grams of carbs in a serving. A serving is 1 slice of bread (1 ounce), ½ cup of cooked cereal, or 1/3 cup of cooked pasta or rice. ? Fruits have 15 grams of carbs in a serving. A serving is 1 small fresh fruit, such as an apple or orange; ½ of a banana; ½ cup of cooked or canned fruit; ½ cup of fruit juice; 1 cup of melon or raspberries; or 2 tablespoons of dried fruit. ? Milk and no-sugar-added yogurt have 15 grams of carbs in a serving. A serving is 1 cup of milk or 2/3 cup of no-sugar-added yogurt. ? Starchy vegetables have 15 grams of carbs in a serving. A serving is ½ cup of mashed potatoes or sweet potato; 1 cup winter squash; ½ of a small baked potato; ½ cup of cooked beans; or ½ cup cooked corn or green peas. · Learn how much carbs to eat each day and at each meal. A dietitian or CDE can teach you how to keep track of the amount of carbs you eat. This is called carbohydrate counting. · If you are not sure how to count carbohydrate grams, use the Plate Method to plan meals. It is a good, quick way to make sure that you have a balanced meal. It also helps you spread carbs throughout the day. ? Divide your plate by types of foods. Put non-starchy vegetables on half the plate, meat or other protein food on one-quarter of the plate, and a grain or starchy vegetable in the final quarter of the plate. To this you can add a small piece of fruit and 1 cup of milk or yogurt, depending on how many carbs you are supposed to eat at a meal.  · Try to eat about the same amount of carbs at each meal. Do not \"save up\" your daily allowance of carbs to eat at one meal.  · Proteins have very little or no carbs per serving.  Examples of proteins are beef, chicken, turkey, fish, eggs, tofu, cheese, cottage cheese, and peanut butter. A serving size of meat is 3 ounces, which is about the size of a deck of cards. Examples of meat substitute serving sizes (equal to 1 ounce of meat) are 1/4 cup of cottage cheese, 1 egg, 1 tablespoon of peanut butter, and ½ cup of tofu. How can you eat out and still eat healthy? · Learn to estimate the serving sizes of foods that have carbohydrate. If you measure food at home, it will be easier to estimate the amount in a serving of restaurant food. · If the meal you order has too much carbohydrate (such as potatoes, corn, or baked beans), ask to have a low-carbohydrate food instead. Ask for a salad or green vegetables. · If you use insulin, check your blood sugar before and after eating out to help you plan how much to eat in the future. · If you eat more carbohydrate at a meal than you had planned, take a walk or do other exercise. This will help lower your blood sugar. What else should you know? · Limit saturated fat, such as the fat from meat and dairy products. This is a healthy choice because people who have diabetes are at higher risk of heart disease. So choose lean cuts of meat and nonfat or low-fat dairy products. Use olive or canola oil instead of butter or shortening when cooking. · Don't skip meals. Your blood sugar may drop too low if you skip meals and take insulin or certain medicines for diabetes. · Check with your doctor before you drink alcohol. Alcohol can cause your blood sugar to drop too low. Alcohol can also cause a bad reaction if you take certain diabetes medicines. Follow-up care is a key part of your treatment and safety. Be sure to make and go to all appointments, and call your doctor if you are having problems. It's also a good idea to know your test results and keep a list of the medicines you take. Where can you learn more? Go to [email or web address] org and sign in to your [email or web address] account. Enter R943 in the [facility] box to learn more about \"Learning About Diabetes Food Guidelines. \"     If you do not have an account, please click on the \"Sign Up Now\" link. Current as of: December 20, 2019               Content Version: 12.6  © 6205-3757 [employer]. Care instructions adapted under license by [facility] ([facility]). If you have questions about a medical condition or this instruction, always ask your healthcare professional. [address] any warranty or liability for your use of this information.

## 2021-03-05 ENCOUNTER — TELEPHONE (OUTPATIENT)
Dept: INTERNAL MEDICINE CLINIC | Age: 81
End: 2021-03-05

## 2021-03-05 ENCOUNTER — IMMUNIZATION (OUTPATIENT)
Dept: PRIMARY CARE CLINIC | Age: 81
End: 2021-03-05
Payer: MEDICARE

## 2021-03-05 DIAGNOSIS — E11.9 TYPE 2 DIABETES MELLITUS WITHOUT COMPLICATION, WITHOUT LONG-TERM CURRENT USE OF INSULIN (HCC): Primary | ICD-10-CM

## 2021-03-05 PROCEDURE — 0012A COVID-19, MODERNA VACCINE 100MCG/0.5ML DOSE: CPT | Performed by: FAMILY MEDICINE

## 2021-03-05 PROCEDURE — 91301 COVID-19, MODERNA VACCINE 100MCG/0.5ML DOSE: CPT | Performed by: FAMILY MEDICINE

## 2021-03-05 NOTE — TELEPHONE ENCOUNTER
----- Message from GUSTAVO Ward - CNP sent at 3/5/2021  8:50 AM EST -----  C peptide indicates adequate insulin activity. Can start Trulicity 7.17 mg weekly x 4 weeks. I was planning to give him a sample with teaching on administration if he can come in today or Monday we can get that done.

## 2021-03-05 NOTE — TELEPHONE ENCOUNTER
I spoke to pt. He came in today for teaching and gave him enough samples to last 1 month. Pt understood how to give trulicity quite well.

## 2021-03-25 ENCOUNTER — TELEPHONE (OUTPATIENT)
Dept: INTERNAL MEDICINE CLINIC | Age: 81
End: 2021-03-25

## 2021-03-25 NOTE — TELEPHONE ENCOUNTER
Reviewed glucose logs, improving glucose control, continue with Trulicity and recheck BMP and submit glucose logs after on it for one month.

## 2021-04-08 NOTE — TELEPHONE ENCOUNTER
Pt notified. He needs a script for trulicity. He does not have one for tomorrow.   Refill sent to [facility].

## 2021-04-14 LAB
AVERAGE GLUCOSE: NORMAL
HBA1C MFR BLD: 7.3 %
TRIGL SERPL-MCNC: 308 MG/DL

## 2021-04-15 ENCOUNTER — TELEPHONE (OUTPATIENT)
Dept: INTERNAL MEDICINE CLINIC | Age: 81
End: 2021-04-15

## 2021-04-15 ENCOUNTER — OFFICE VISIT (OUTPATIENT)
Dept: INTERNAL MEDICINE CLINIC | Age: 81
End: 2021-04-15
Payer: MEDICARE

## 2021-04-15 VITALS
BODY MASS INDEX: 25.23 KG/M2 | WEIGHT: 180.2 LBS | SYSTOLIC BLOOD PRESSURE: 124 MMHG | TEMPERATURE: 97.7 F | HEART RATE: 70 BPM | HEIGHT: 71 IN | DIASTOLIC BLOOD PRESSURE: 60 MMHG

## 2021-04-15 DIAGNOSIS — E78.2 MIXED HYPERLIPIDEMIA: ICD-10-CM

## 2021-04-15 DIAGNOSIS — E11.9 TYPE 2 DIABETES MELLITUS WITHOUT COMPLICATION, WITHOUT LONG-TERM CURRENT USE OF INSULIN (HCC): Primary | ICD-10-CM

## 2021-04-15 DIAGNOSIS — I10 ESSENTIAL HYPERTENSION: ICD-10-CM

## 2021-04-15 DIAGNOSIS — N18.31 STAGE 3A CHRONIC KIDNEY DISEASE (HCC): ICD-10-CM

## 2021-04-15 PROCEDURE — G8417 CALC BMI ABV UP PARAM F/U: HCPCS | Performed by: NURSE PRACTITIONER

## 2021-04-15 PROCEDURE — 4040F PNEUMOC VAC/ADMIN/RCVD: CPT | Performed by: NURSE PRACTITIONER

## 2021-04-15 PROCEDURE — 1123F ACP DISCUSS/DSCN MKR DOCD: CPT | Performed by: NURSE PRACTITIONER

## 2021-04-15 PROCEDURE — 1036F TOBACCO NON-USER: CPT | Performed by: NURSE PRACTITIONER

## 2021-04-15 PROCEDURE — G8427 DOCREV CUR MEDS BY ELIG CLIN: HCPCS | Performed by: NURSE PRACTITIONER

## 2021-04-15 PROCEDURE — 99214 OFFICE O/P EST MOD 30 MIN: CPT | Performed by: NURSE PRACTITIONER

## 2021-04-15 SDOH — ECONOMIC STABILITY: FOOD INSECURITY: WITHIN THE PAST 12 MONTHS, THE FOOD YOU BOUGHT JUST DIDN'T LAST AND YOU DIDN'T HAVE MONEY TO GET MORE.: NEVER TRUE

## 2021-04-15 SDOH — ECONOMIC STABILITY: TRANSPORTATION INSECURITY
IN THE PAST 12 MONTHS, HAS THE LACK OF TRANSPORTATION KEPT YOU FROM MEDICAL APPOINTMENTS OR FROM GETTING MEDICATIONS?: NO

## 2021-04-15 SDOH — ECONOMIC STABILITY: TRANSPORTATION INSECURITY
IN THE PAST 12 MONTHS, HAS LACK OF TRANSPORTATION KEPT YOU FROM MEETINGS, WORK, OR FROM GETTING THINGS NEEDED FOR DAILY LIVING?: NO

## 2021-04-15 SDOH — ECONOMIC STABILITY: FOOD INSECURITY: WITHIN THE PAST 12 MONTHS, YOU WORRIED THAT YOUR FOOD WOULD RUN OUT BEFORE YOU GOT MONEY TO BUY MORE.: NEVER TRUE

## 2021-04-15 NOTE — TELEPHONE ENCOUNTER
If it has a cut line, and does not say ER or XR behind the Metformin HCL it can be cut/broken in half.

## 2021-04-15 NOTE — PROGRESS NOTES
daily, Disp: 60 tablet, Rfl: 1    nitroGLYCERIN (NITROSTAT) 0.4 MG SL tablet, Place 1 tablet under the tongue every 5 minutes as needed. , Disp: 25 tablet, Rfl: 5    Multiple Vitamin (MULTI-VITAMIN PO), Take  by mouth.  , Disp: , Rfl:     aspirin 81 MG tablet, Take 81 mg by mouth daily. , Disp: , Rfl:     atorvastatin (LIPITOR) 80 MG tablet, Take 80 mg by mouth daily. , Disp: , Rfl:     The patient has No Known Allergies. Past Medical History  Millie Samayoa  has a past medical history of CAD (coronary artery disease), Hyperlipidemia, Hypertension, Left ventricular dysfunction, and Type II or unspecified type diabetes mellitus without mention of complication, not stated as uncontrolled. Past Surgical History  The patient  has a past surgical history that includes Tonsillectomy (at the age of 13 yrs old) and Adenoidectomy (at the age of 13 yrs ). Family History  This patient's family history includes Prostate Cancer in his paternal uncle. Social History  Millie Samayoa  reports that he quit smoking about 29 years ago. His smoking use included cigarettes. He has a 20.00 pack-year smoking history. He has never used smokeless tobacco. He reports that he does not drink alcohol or use drugs. Health Maintenance:    Health Maintenance   Topic Date Due    Hepatitis C screen  Never done    DTaP/Tdap/Td vaccine (1 - Tdap) Never done    Shingles Vaccine (1 of 2) Never done    Pneumococcal 65+ years Vaccine (1 of 1 - PPSV23) 03/12/2007    Annual Wellness Visit (AWV)  Never done    Flu vaccine (Season Ended) 09/01/2021    Potassium monitoring  03/04/2022    Creatinine monitoring  03/04/2022    Lipid screen  04/14/2022    COVID-19 Vaccine  Completed    Hepatitis A vaccine  Aged Out    Hib vaccine  Aged Out    Meningococcal (ACWY) vaccine  Aged Out       Subjective:      Review of Systems   Constitutional: Negative for chills, fatigue and fever. HENT: Negative for sore throat and trouble swallowing.     Eyes: Negative for visual disturbance. Respiratory: Negative for cough and shortness of breath. Cardiovascular: Negative for chest pain and leg swelling. Gastrointestinal: Negative for abdominal pain, constipation, diarrhea, nausea and vomiting. Genitourinary: Negative for difficulty urinating. Musculoskeletal: Negative for arthralgias and myalgias. Skin: Negative for rash and wound. Neurological: Negative for numbness. Objective:     /60 (Site: Left Upper Arm)   Pulse 70   Temp 97.7 °F (36.5 °C)   Ht 5' 10.98\" (1.803 m)   Wt 180 lb 3.2 oz (81.7 kg)   BMI 25.14 kg/m²     Physical Exam  Vitals signs reviewed. Constitutional:       General: He is not in acute distress. Appearance: He is well-developed. He is not diaphoretic. HENT:      Head: Normocephalic and atraumatic. Mouth/Throat:      Pharynx: No oropharyngeal exudate. Neck:      Musculoskeletal: Normal range of motion and neck supple. Thyroid: No thyromegaly. Cardiovascular:      Rate and Rhythm: Normal rate and regular rhythm. Heart sounds: Normal heart sounds. No murmur. No friction rub. No gallop. Pulmonary:      Effort: Pulmonary effort is normal. No respiratory distress. Breath sounds: Normal breath sounds. No wheezing or rales. Abdominal:      General: There is no distension. Palpations: Abdomen is soft. Tenderness: There is no abdominal tenderness. Musculoskeletal: Normal range of motion. General: No tenderness. Lymphadenopathy:      Cervical: No cervical adenopathy. Skin:     General: Skin is warm and dry. Coloration: Skin is not pale. Findings: No erythema or rash. Neurological:      Mental Status: He is alert and oriented to person, place, and time.          Labs Reviewed 4/15/2021:    Lab Results   Component Value Date    WBC 6.2 04/09/2018    HGB 12.6 (L) 04/09/2018    HCT 36.8 (L) 04/09/2018     04/09/2018    CHOL 132 08/09/2018    TRIG 308 04/14/2021

## 2021-04-22 ASSESSMENT — ENCOUNTER SYMPTOMS
COUGH: 0
DIARRHEA: 0
NAUSEA: 0
TROUBLE SWALLOWING: 0
CONSTIPATION: 0
VOMITING: 0
SHORTNESS OF BREATH: 0
SORE THROAT: 0
ABDOMINAL PAIN: 0

## 2021-06-29 ENCOUNTER — NURSE ONLY (OUTPATIENT)
Dept: LAB | Age: 81
End: 2021-06-29

## 2021-06-29 DIAGNOSIS — N18.31 STAGE 3A CHRONIC KIDNEY DISEASE (HCC): ICD-10-CM

## 2021-06-29 DIAGNOSIS — E11.9 TYPE 2 DIABETES MELLITUS WITHOUT COMPLICATION, WITHOUT LONG-TERM CURRENT USE OF INSULIN (HCC): ICD-10-CM

## 2021-06-29 LAB
ANION GAP SERPL CALCULATED.3IONS-SCNC: 8 MEQ/L (ref 8–16)
BUN BLDV-MCNC: 27 MG/DL (ref 7–22)
CALCIUM SERPL-MCNC: 10.2 MG/DL (ref 8.5–10.5)
CHLORIDE BLD-SCNC: 102 MEQ/L (ref 98–111)
CO2: 29 MEQ/L (ref 23–33)
CREAT SERPL-MCNC: 1.1 MG/DL (ref 0.4–1.2)
GFR SERPL CREATININE-BSD FRML MDRD: 65 ML/MIN/1.73M2
GLUCOSE BLD-MCNC: 166 MG/DL (ref 70–108)
POTASSIUM SERPL-SCNC: 4.4 MEQ/L (ref 3.5–5.2)
SODIUM BLD-SCNC: 139 MEQ/L (ref 135–145)

## 2021-06-30 ENCOUNTER — OFFICE VISIT (OUTPATIENT)
Dept: INTERNAL MEDICINE CLINIC | Age: 81
End: 2021-06-30
Payer: MEDICARE

## 2021-06-30 VITALS
DIASTOLIC BLOOD PRESSURE: 50 MMHG | HEIGHT: 71 IN | WEIGHT: 183 LBS | SYSTOLIC BLOOD PRESSURE: 110 MMHG | TEMPERATURE: 97.6 F | HEART RATE: 54 BPM | BODY MASS INDEX: 25.62 KG/M2

## 2021-06-30 DIAGNOSIS — I10 ESSENTIAL HYPERTENSION: Primary | ICD-10-CM

## 2021-06-30 DIAGNOSIS — E11.9 TYPE 2 DIABETES MELLITUS WITHOUT COMPLICATION, WITHOUT LONG-TERM CURRENT USE OF INSULIN (HCC): ICD-10-CM

## 2021-06-30 DIAGNOSIS — N18.31 STAGE 3A CHRONIC KIDNEY DISEASE (HCC): ICD-10-CM

## 2021-06-30 DIAGNOSIS — E78.2 MIXED HYPERLIPIDEMIA: ICD-10-CM

## 2021-06-30 PROCEDURE — G8417 CALC BMI ABV UP PARAM F/U: HCPCS | Performed by: STUDENT IN AN ORGANIZED HEALTH CARE EDUCATION/TRAINING PROGRAM

## 2021-06-30 PROCEDURE — 99213 OFFICE O/P EST LOW 20 MIN: CPT | Performed by: STUDENT IN AN ORGANIZED HEALTH CARE EDUCATION/TRAINING PROGRAM

## 2021-06-30 PROCEDURE — 4040F PNEUMOC VAC/ADMIN/RCVD: CPT | Performed by: STUDENT IN AN ORGANIZED HEALTH CARE EDUCATION/TRAINING PROGRAM

## 2021-06-30 PROCEDURE — 93000 ELECTROCARDIOGRAM COMPLETE: CPT | Performed by: STUDENT IN AN ORGANIZED HEALTH CARE EDUCATION/TRAINING PROGRAM

## 2021-06-30 PROCEDURE — 3051F HG A1C>EQUAL 7.0%<8.0%: CPT | Performed by: STUDENT IN AN ORGANIZED HEALTH CARE EDUCATION/TRAINING PROGRAM

## 2021-06-30 PROCEDURE — 1123F ACP DISCUSS/DSCN MKR DOCD: CPT | Performed by: STUDENT IN AN ORGANIZED HEALTH CARE EDUCATION/TRAINING PROGRAM

## 2021-06-30 PROCEDURE — G8427 DOCREV CUR MEDS BY ELIG CLIN: HCPCS | Performed by: STUDENT IN AN ORGANIZED HEALTH CARE EDUCATION/TRAINING PROGRAM

## 2021-06-30 PROCEDURE — 1036F TOBACCO NON-USER: CPT | Performed by: STUDENT IN AN ORGANIZED HEALTH CARE EDUCATION/TRAINING PROGRAM

## 2021-06-30 RX ORDER — LISINOPRIL 2.5 MG/1
2.5 TABLET ORAL DAILY
COMMUNITY
End: 2021-06-30 | Stop reason: SDUPTHER

## 2021-06-30 RX ORDER — ATORVASTATIN CALCIUM 80 MG/1
80 TABLET, FILM COATED ORAL DAILY
Qty: 90 TABLET | Refills: 1 | Status: SHIPPED | OUTPATIENT
Start: 2021-06-30 | End: 2021-07-28 | Stop reason: SDUPTHER

## 2021-06-30 RX ORDER — LISINOPRIL 2.5 MG/1
2.5 TABLET ORAL DAILY
Qty: 90 TABLET | Refills: 1 | Status: SHIPPED | OUTPATIENT
Start: 2021-06-30 | End: 2021-11-10 | Stop reason: SDUPTHER

## 2021-06-30 RX ORDER — METOPROLOL TARTRATE 50 MG/1
25 TABLET, FILM COATED ORAL 2 TIMES DAILY
Qty: 90 TABLET | Refills: 1 | Status: SHIPPED | OUTPATIENT
Start: 2021-06-30 | End: 2021-11-10 | Stop reason: SDUPTHER

## 2021-06-30 RX ORDER — LISINOPRIL 5 MG/1
2.5 TABLET ORAL 2 TIMES DAILY
COMMUNITY
End: 2021-06-30

## 2021-06-30 ASSESSMENT — ENCOUNTER SYMPTOMS
ABDOMINAL PAIN: 0
COUGH: 0
DIARRHEA: 0
SORE THROAT: 0
CONSTIPATION: 0
BACK PAIN: 0
WHEEZING: 0
EYE REDNESS: 0
SHORTNESS OF BREATH: 0

## 2021-06-30 NOTE — PROGRESS NOTES
IM follow up       Pt was seen in the IM resident clinic today , with  HTN, Type - 2 DM  Along with Dyslipidemia and CKD        Attending supervising physicians attestation statement:      I was present with the resident physician during the history and exam.  I Discussed the findings and plans with the resident physician  personally   After examining the patient, and agree with the resident'S note as outlined .        Electronically signed by Zoe Karimi MD on 6/30/2021 at 4:56 PM

## 2021-06-30 NOTE — PROGRESS NOTES
Son Jin (:  1940) is a 80 y.o. male,Established patient, here for evaluation of the following chief complaint(s):  Diabetes, Hyperlipidemia, Hypertension, and Chronic Kidney Disease         ASSESSMENT/PLAN:  1. Essential hypertension  -     EKG 12 Lead  -     Continue with current medications/dosage - Metoprolol and Lisinopril  2. Mixed hyperlipidemia  -     Continue with current medication and dosage - Lipitor  -     Lipid Panel; Future  3. Type 2 diabetes mellitus without complication, without long-term current use of insulin (HCC)  -     Hemoglobin A1C; Future  4. Stage 3a chronic kidney disease ([facility]  -      [facility] [address], [facility], [place], Nephrology, [place]      Return in about 4 weeks (around 2021). Subjective   SUBJECTIVE/OBJECTIVE:  HPI     Mr. Princess Kothari is a 79 yo male here for routine follow-up. Diabetes Mellitus 2 - Last HgbA1c 7.3. On metformin 500 mg daily. Yearly foot and eye exams completed at the [place]. Will repeat A1c in 1 month prior to next visit. Advised patient to bring glucometer readings at next visit. CKD Stage 3 -  Cr stable at 1.1. On Lisinopril 2.5 mg daily. Will refer to Dr. Adilia Garrido MD-Nephrology for close follow-up. Hyperlipidemia - On Lipitor 80 mg daily. Fasting lipid panel to be completed prior to next visit. Primary Hypertension - Controlled. On metoprolol 25 mg BID and lisinopril 2.5 mg daily. Normal orthostats - checked in office today. Review of Systems   Constitutional: Negative for chills, diaphoresis and fever. HENT: Negative for congestion, drooling, hearing loss and sore throat. Eyes: Negative for redness and visual disturbance. Respiratory: Negative for cough, shortness of breath and wheezing. Cardiovascular: Negative for chest pain, palpitations and leg swelling. Gastrointestinal: Negative for abdominal pain, constipation and diarrhea. Endocrine: Negative for polydipsia and polyuria.    Genitourinary: Negative for dysuria, frequency, hematuria and urgency. Musculoskeletal: Negative for back pain and myalgias. Skin: Negative for pallor and rash. Neurological: Negative for dizziness, syncope and numbness. Psychiatric/Behavioral: Negative for behavioral problems and decreased concentration. The patient is not nervous/anxious. Objective   Physical Exam  Constitutional:       General: He is not in acute distress. Appearance: Normal appearance. He is not ill-appearing. HENT:      Head: Normocephalic and atraumatic. Right Ear: External ear normal.      Left Ear: External ear normal.   Eyes:      General: No scleral icterus. Right eye: No discharge. Left eye: No discharge. Cardiovascular:      Rate and Rhythm: Normal rate and regular rhythm. Pulses: Normal pulses. Heart sounds: Normal heart sounds. No murmur heard. No friction rub. No gallop. Pulmonary:      Effort: Pulmonary effort is normal. No respiratory distress. Breath sounds: Normal breath sounds. No wheezing or rales. Abdominal:      General: Abdomen is flat. There is no distension. Palpations: Abdomen is soft. Tenderness: There is no abdominal tenderness. There is no guarding. Musculoskeletal:         General: Normal range of motion. Cervical back: Normal range of motion. Right lower leg: No edema. Left lower leg: No edema. Skin:     General: Skin is warm and dry. Capillary Refill: Capillary refill takes less than 2 seconds. Neurological:      General: No focal deficit present. Mental Status: He is alert and oriented to person, place, and time. Psychiatric:         Behavior: Behavior normal.         Thought Content:  Thought content normal.            On this date 6/30/2021 I have spent 30 minutes reviewing previous notes, test results and face to face with the patient discussing the diagnosis and importance of compliance with the treatment plan as well as

## 2021-06-30 NOTE — PATIENT INSTRUCTIONS
Please call jesús at dr griffith's office with your lisinopril [phone] and press option 2 for the nurse

## 2021-07-28 DIAGNOSIS — E78.2 MIXED HYPERLIPIDEMIA: ICD-10-CM

## 2021-07-28 RX ORDER — ATORVASTATIN CALCIUM 80 MG/1
80 TABLET, FILM COATED ORAL DAILY
Qty: 90 TABLET | Refills: 1 | Status: SHIPPED | OUTPATIENT
Start: 2021-07-28 | End: 2021-11-10 | Stop reason: SDUPTHER

## 2021-07-28 NOTE — TELEPHONE ENCOUNTER
Xena from [facility] requesting script for atorvastatin be faxed to the [facility] at [phone].   Resident clinic pt

## 2021-08-02 ENCOUNTER — NURSE ONLY (OUTPATIENT)
Dept: LAB | Age: 81
End: 2021-08-02

## 2021-08-02 DIAGNOSIS — E78.2 MIXED HYPERLIPIDEMIA: ICD-10-CM

## 2021-08-02 DIAGNOSIS — E11.9 TYPE 2 DIABETES MELLITUS WITHOUT COMPLICATION, WITHOUT LONG-TERM CURRENT USE OF INSULIN (HCC): ICD-10-CM

## 2021-08-02 LAB
AVERAGE GLUCOSE: 156 MG/DL (ref 70–126)
CHOLESTEROL, TOTAL: 214 MG/DL (ref 100–199)
HBA1C MFR BLD: 7.2 % (ref 4.4–6.4)
HDLC SERPL-MCNC: 43 MG/DL
LDL CHOLESTEROL CALCULATED: 116 MG/DL
TRIGL SERPL-MCNC: 275 MG/DL (ref 0–199)

## 2021-08-04 ENCOUNTER — OFFICE VISIT (OUTPATIENT)
Dept: INTERNAL MEDICINE CLINIC | Age: 81
End: 2021-08-04
Payer: MEDICARE

## 2021-08-04 VITALS
HEIGHT: 71 IN | SYSTOLIC BLOOD PRESSURE: 110 MMHG | WEIGHT: 177.4 LBS | DIASTOLIC BLOOD PRESSURE: 60 MMHG | HEART RATE: 60 BPM | TEMPERATURE: 97.3 F | BODY MASS INDEX: 24.84 KG/M2

## 2021-08-04 DIAGNOSIS — I10 ESSENTIAL HYPERTENSION: ICD-10-CM

## 2021-08-04 DIAGNOSIS — E78.2 MIXED HYPERLIPIDEMIA: Primary | ICD-10-CM

## 2021-08-04 DIAGNOSIS — E11.9 TYPE 2 DIABETES MELLITUS WITHOUT COMPLICATION, WITHOUT LONG-TERM CURRENT USE OF INSULIN (HCC): ICD-10-CM

## 2021-08-04 PROCEDURE — 99214 OFFICE O/P EST MOD 30 MIN: CPT | Performed by: STUDENT IN AN ORGANIZED HEALTH CARE EDUCATION/TRAINING PROGRAM

## 2021-08-04 PROCEDURE — 3051F HG A1C>EQUAL 7.0%<8.0%: CPT | Performed by: STUDENT IN AN ORGANIZED HEALTH CARE EDUCATION/TRAINING PROGRAM

## 2021-08-04 PROCEDURE — 1123F ACP DISCUSS/DSCN MKR DOCD: CPT | Performed by: STUDENT IN AN ORGANIZED HEALTH CARE EDUCATION/TRAINING PROGRAM

## 2021-08-04 PROCEDURE — G8427 DOCREV CUR MEDS BY ELIG CLIN: HCPCS | Performed by: STUDENT IN AN ORGANIZED HEALTH CARE EDUCATION/TRAINING PROGRAM

## 2021-08-04 PROCEDURE — G8420 CALC BMI NORM PARAMETERS: HCPCS | Performed by: STUDENT IN AN ORGANIZED HEALTH CARE EDUCATION/TRAINING PROGRAM

## 2021-08-04 PROCEDURE — 4040F PNEUMOC VAC/ADMIN/RCVD: CPT | Performed by: STUDENT IN AN ORGANIZED HEALTH CARE EDUCATION/TRAINING PROGRAM

## 2021-08-04 PROCEDURE — 1036F TOBACCO NON-USER: CPT | Performed by: STUDENT IN AN ORGANIZED HEALTH CARE EDUCATION/TRAINING PROGRAM

## 2021-08-04 RX ORDER — FENOFIBRATE 160 MG/1
160 TABLET ORAL DAILY
Qty: 30 TABLET | Refills: 5 | Status: SHIPPED | OUTPATIENT
Start: 2021-08-04 | End: 2021-08-17 | Stop reason: ALTCHOICE

## 2021-08-04 ASSESSMENT — ENCOUNTER SYMPTOMS
SHORTNESS OF BREATH: 0
DIARRHEA: 0
ABDOMINAL PAIN: 0
NAUSEA: 0
CONSTIPATION: 0
SORE THROAT: 0
COUGH: 0
WHEEZING: 0
EYE PAIN: 0

## 2021-08-04 NOTE — PROGRESS NOTES
Blanquita Chacko (:  1940) is a 80 y.o. male,Established patient, here for evaluation of the following chief complaint(s):  Diabetes (4 week follow up) and Other ([facility] this [facility] yet--appt coming up [place] 3 months)         ASSESSMENT/PLAN:  1. Mixed hyperlipidemia  -     fenofibrate (TRIGLIDE) 160 MG tablet; Take 1 tablet by mouth daily, Disp-30 tablet, R-5Print  2. Type 2 diabetes mellitus without complication, without long-term current use of insulin (HCC)  -     Hemoglobin A1C; Future  -     Basic Metabolic Panel; Future  3. Essential hypertension      Return in about 13 weeks (around 11/3/2021). Subjective   SUBJECTIVE/OBJECTIVE:  HPI    Mr. Torres Tapia is a 81 yo male here for routine follow-up.      Diabetes Mellitus 2:   · Last HgbA1c 7.2  · On metformin 500 mg BID   · Eye exam completed at the [place]   · Foot exam completed 2021  · Patient to attempt diet modification for better BS control  · A1c in 3 months prior to next visit  · Advised patient to bring glucometer readings at next visit     CKD Stage 3:    · Cr stable at 1.1   · Lisinopril 2.5 mg daily   · BMP for next visit    Hyperlipidemia:   · Last   · Lipitor 80 mg daily    Hypertriglyceridemia:  · Triglycerides 275   · Fenofibrate 160 mg initiated     Primary Hypertension:  · Controlled  · Metoprolol 25 mg BID and lisinopril 2.5 mg daily         Review of Systems   Constitutional: Negative for chills, fatigue and fever. HENT: Negative for congestion, postnasal drip, sneezing and sore throat. Eyes: Negative for pain and visual disturbance. Respiratory: Negative for cough, shortness of breath and wheezing. Cardiovascular: Negative for chest pain, palpitations and leg swelling. Gastrointestinal: Negative for abdominal pain, constipation, diarrhea and nausea. Endocrine: Negative for polydipsia and polyuria.    Genitourinary: Negative for dysuria, frequency, hematuria and urgency. Musculoskeletal: Negative for arthralgias and myalgias. Skin: Negative for rash. Neurological: Negative for dizziness, syncope and light-headedness. Hematological: Negative for adenopathy. Psychiatric/Behavioral: Negative for behavioral problems and decreased concentration. The patient is not nervous/anxious. Objective   Physical Exam  Constitutional:       General: He is not in acute distress. Appearance: He is not ill-appearing. HENT:      Head: Normocephalic and atraumatic. Eyes:      General: No scleral icterus. Right eye: No discharge. Left eye: No discharge. Extraocular Movements: Extraocular movements intact. Pupils: Pupils are equal, round, and reactive to light. Cardiovascular:      Rate and Rhythm: Normal rate and regular rhythm. Pulses: Normal pulses. Heart sounds: Normal heart sounds. No murmur heard. No friction rub. No gallop. Pulmonary:      Effort: Pulmonary effort is normal. No respiratory distress. Breath sounds: Normal breath sounds. No wheezing or rales. Abdominal:      General: Abdomen is flat. Bowel sounds are normal. There is no distension. Palpations: Abdomen is soft. Tenderness: There is no abdominal tenderness. There is no guarding. Musculoskeletal:         General: Normal range of motion. Cervical back: Normal range of motion. No rigidity or tenderness. Right lower leg: No edema. Left lower leg: No edema. Skin:     Capillary Refill: Capillary refill takes less than 2 seconds. Neurological:      General: No focal deficit present. Mental Status: He is alert and oriented to person, place, and time. Psychiatric:         Mood and Affect: Mood normal.         Thought Content:  Thought content normal.            On this date 8/4/2021 I have spent 30 minutes reviewing previous notes, test results and face to face with the patient discussing the diagnosis and importance of compliance with the treatment plan as well as documenting on the day of the visit. An electronic signature was used to authenticate this note. --Justa Ontiveros MD   . [facility]  [address]  Dept: [phone]  Dept Fax: 73 : [phone]

## 2021-08-04 NOTE — PROGRESS NOTES
After pharmacist chart review, the following recommendations are made:    -Recommend clarifying metformin dose. Evert Gabriel was supposed to titrate back up to metformin 1000mg BID w/ meals.     For Pharmacy Admin Tracking Only   Intervention Detail: Dose Adjustment: 1, reason: Therapy Optimization   Total # of Interventions Recommended: 1   Total # of Interventions Accepted: 1   Time Spent (min): 93 Guadalupe Pope, PharmD, BCPS  [phone]

## 2021-08-17 RX ORDER — FENOFIBRATE 145 MG/1
145 TABLET, COATED ORAL DAILY
Qty: 90 TABLET | Refills: 0 | Status: SHIPPED | OUTPATIENT
Start: 2021-08-17 | End: 2021-08-17 | Stop reason: SDUPTHER

## 2021-08-17 RX ORDER — FENOFIBRATE 145 MG/1
145 TABLET, COATED ORAL DAILY
Qty: 90 TABLET | Refills: 0 | Status: SHIPPED | OUTPATIENT
Start: 2021-08-17 | End: 2021-11-10 | Stop reason: SDUPTHER

## 2021-08-17 NOTE — TELEPHONE ENCOUNTER
RX for fenofibrate to be printed so I can fax it to [facility] per Bi at [facility]. Fax number is [phone]. I called and cancelled rx at [facility].

## 2021-08-17 NOTE — TELEPHONE ENCOUNTER
Received Rx for Fenofibrate 160mg which is not approved through insurance. Can we please send 145mg which is approved? Sent in by Dr. Thanh Santos on 8/4/21.

## 2021-08-30 ENCOUNTER — OFFICE VISIT (OUTPATIENT)
Dept: NEPHROLOGY | Age: 81
End: 2021-08-30
Payer: MEDICARE

## 2021-08-30 VITALS
BODY MASS INDEX: 25.34 KG/M2 | SYSTOLIC BLOOD PRESSURE: 135 MMHG | WEIGHT: 181 LBS | TEMPERATURE: 97.5 F | HEART RATE: 63 BPM | HEIGHT: 71 IN | OXYGEN SATURATION: 97 % | DIASTOLIC BLOOD PRESSURE: 65 MMHG

## 2021-08-30 DIAGNOSIS — N18.31 STAGE 3A CHRONIC KIDNEY DISEASE (HCC): Primary | ICD-10-CM

## 2021-08-30 PROCEDURE — 99203 OFFICE O/P NEW LOW 30 MIN: CPT | Performed by: INTERNAL MEDICINE

## 2021-08-30 PROCEDURE — 4040F PNEUMOC VAC/ADMIN/RCVD: CPT | Performed by: INTERNAL MEDICINE

## 2021-08-30 PROCEDURE — 1123F ACP DISCUSS/DSCN MKR DOCD: CPT | Performed by: INTERNAL MEDICINE

## 2021-08-30 PROCEDURE — G8417 CALC BMI ABV UP PARAM F/U: HCPCS | Performed by: INTERNAL MEDICINE

## 2021-08-30 PROCEDURE — G8427 DOCREV CUR MEDS BY ELIG CLIN: HCPCS | Performed by: INTERNAL MEDICINE

## 2021-08-30 PROCEDURE — 1036F TOBACCO NON-USER: CPT | Performed by: INTERNAL MEDICINE

## 2021-08-30 NOTE — PROGRESS NOTES
Not on file   Tobacco Use    Smoking status: Former Smoker     Packs/day: 1.00     Years: 20.00     Pack years: 20.00     Types: Cigarettes     Quit date: 1992     Years since quittin.6    Smokeless tobacco: Never Used   Substance and Sexual Activity    Alcohol use: No    Drug use: No    Sexual activity: Not on file   Other Topics Concern    Not on file   Social History Narrative    Not on file     Social Determinants of Health     Financial Resource Strain: Low Risk     Difficulty of Paying Living Expenses: Not very hard   Food Insecurity: No Food Insecurity    Worried About Running Out of Food in the Last Year: Never true    Girish of Food in the Last Year: Never true   Transportation Needs: No Transportation Needs    Lack of Transportation (Medical): No    Lack of Transportation (Non-Medical):  No   Physical Activity:     Days of Exercise per Week:     Minutes of Exercise per Session:    Stress:     Feeling of Stress :    Social Connections:     Frequency of Communication with Friends and Family:     Frequency of Social Gatherings with Friends and Family:     Attends Buddhist Services:     Active Member of Clubs or Organizations:     Attends Club or Organization Meetings:     Marital Status:    Intimate Partner Violence:     Fear of Current or Ex-Partner:     Emotionally Abused:     Physically Abused:     Sexually Abused:         Review of Systems:  Constitutional: no fever or chills  Head: No headaches  Eyes: no blurry vision, no discharge  Ears: no ear pain or hearing changes  Nose: no runny nose or epistaxis  Respiratory: no shortness of breath or cough or sputum production  Cardiovascular: no chest pain, no edema  GI: no nausea, no vomiting or diarrhea  : denies any hematuria, no flank pain  Skin: no rash  Musculoskeletal: no joint pain, moves all ext  Neuro: no tremor, no slurred speech  Psychiatric: stable mood, no depression or insomnia     Home Medications:  Prior to Admission medications    Medication Sig Start Date End Date Taking? Authorizing Provider   fenofibrate (TRICOR) 145 MG tablet Take 1 tablet by mouth daily 8/17/21  Yes Jose Almaguer MD   atorvastatin (LIPITOR) 80 MG tablet Take 1 tablet by mouth daily 7/28/21  Yes Jose Almaguer MD   metFORMIN (GLUCOPHAGE) 1000 MG tablet Take 500 mg by mouth 2 times daily (with meals)    Yes Historical Provider, MD   metoprolol tartrate (LOPRESSOR) 50 MG tablet Take 0.5 tablets by mouth 2 times daily 6/30/21  Yes Joseph Ochoa MD   lisinopril (PRINIVIL;ZESTRIL) 2.5 MG tablet Take 1 tablet by mouth daily 6/30/21  Yes Joseph Ochoa MD   Cyanocobalamin (VITAMIN B 12 PO) Take 1,000 mg by mouth daily   Yes Historical Provider, MD   vitamin D (CHOLECALCIFEROL) 25 MCG (1000 UT) TABS tablet Take 1,000 Units by mouth daily   Yes Historical Provider, MD   blood glucose monitor kit and supplies Check twice daily and as needed for symptoms of low/high blood glucose. Dispense sufficient amount for indicated testing frequency plus additional to accommodate PRN testing needs. Dispense all needed supplies to include: monitor, strips, lancing device, lancets, control solutions, alcohol swabs. 1/21/21  Yes GUSTAVO Arreguin - CNP   Glucose Blood (BLOOD GLUCOSE TEST STRIPS) STRP Test Blood Sugars once daily. Accu-Check test strips. Dx E11.9 3/15/16  Yes Christopher Braswell MD   nitroGLYCERIN (NITROSTAT) 0.4 MG SL tablet Place 1 tablet under the tongue every 5 minutes as needed. 10/14/11  Yes Christopher Braswell MD   Multiple Vitamin (MULTI-VITAMIN PO) Take  by mouth. Yes Historical Provider, MD   aspirin 81 MG tablet Take 81 mg by mouth daily.      Yes Historical Provider, MD        Physical Examination:  VITALS:  /65 (Site: Left Upper Arm, Position: Sitting, Cuff Size: Large Adult)   Pulse 63   Temp 97.5 °F (36.4 °C) (Temporal)   Ht 5' 10.5\" (1.791 m)   Wt 181 lb (82.1 kg)   SpO2 97%   BMI 25.60 kg/m²   Body mass index is 25.6 kg/m². Wt Readings from Last 3 Encounters:   08/30/21 181 lb (82.1 kg)   08/04/21 177 lb 6.4 oz (80.5 kg)   06/30/21 183 lb (83 kg)     Constitutional and General Appearance: alert and cooperative with exam, appears comfortable, no distress  Eyes: no icteric sclera, no pallor conjunctiva, no discharge seen from either eye  Ears and Nose: normal external appearance of left and right ear and nose. No active drainage from nose.    Oral: moist oral mucus membranes  Neck: No jugular venous distention, appears symmetric, good ROM  Lungs: Air entry B/L, no crackles or rales, no use of accessory muscles  Heart: regular rate, S1, S2  Extremities: no LE edema  GI: soft, non-tender, no guarding  Skin: no rash seen on exposed extremities  Musculo: moves all extremities  Neuro: no slurred speech, no facial drooping, no asterixis  Psychiatric: Awake, alert, Oriented     Laboratory & Diagnostics:  CBC:   Lab Results   Component Value Date    WBC 6.2 04/09/2018    HGB 12.6 (L) 04/09/2018    HCT 36.8 (L) 04/09/2018    MCV 91 04/09/2018     04/09/2018     BMP:    Lab Results   Component Value Date     06/29/2021     03/04/2021     06/14/2018    K 4.4 06/29/2021    K 4.7 03/04/2021    K 5.3 06/14/2018     06/29/2021     03/04/2021     06/14/2018    CO2 29 06/29/2021    CO2 30 03/04/2021    CO2 29 06/14/2018    BUN 27 (H) 06/29/2021    BUN 28 (H) 03/04/2021    BUN 27 (H) 06/14/2018    CREATININE 1.1 06/29/2021    CREATININE 1.2 03/04/2021    CREATININE 1.1 06/14/2018    GLUCOSE 166 (H) 06/29/2021    GLUCOSE 215 (H) 03/04/2021    GLUCOSE 95 06/14/2018      Hepatic:   Lab Results   Component Value Date    AST 42 (H) 04/09/2018     (H) 12/20/2011    ALT 10 06/14/2018    ALT 9 (L) 04/09/2018    ALT 7 (L) 12/12/2017    BILITOT 0.6 04/09/2018    BILITOT 0.6 12/20/2011    ALKPHOS 79 04/09/2018    ALKPHOS 80 12/20/2011     BNP:   Lab Results   Component Value Date    BNP 74.8 03/18/2012 Lipids:   Lab Results   Component Value Date    CHOL 214 (H) 08/02/2021    HDL 43 08/02/2021     INR:   Lab Results   Component Value Date    INR 0.89 04/09/2018     URINE:   Lab Results   Component Value Date    NAUR 45 12/19/2011    PROTUR Negative 07/17/2019     Lab Results   Component Value Date    NITRU Negative 07/17/2019    COLORU Yellow 07/17/2019    COLORU RED 04/09/2018    PHUR 6.5 04/09/2018    WBCUA  04/09/2018    RBCUA > 200 04/09/2018    YEAST NONE SEEN 04/09/2018    BACTERIA MANY 04/09/2018    SPECGRAV 1.025 04/09/2018    LEUKOCYTESUR MODERATE 04/09/2018    LEUKOCYTESUR Trace 04/09/2018    UROBILINOGEN 0.20 07/17/2019    BILIRUBINUR Negative 07/17/2019    BLOODU Negative 07/17/2019    BLOODU LARGE 04/09/2018    GLUCOSEU Negative 07/17/2019    KETUA Negative 07/17/2019      Microalbumen/Creatinine ratio:  No components found for: RUCREAT        Impression/Plan:   1. 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 Check for proteinuria. Last renal US reviewed, do not need to repeat  2. HTN; controlled  3. DM  4. Simple cyst right kidney    Will see him back in 9 months. He reports his insurance will not pay for bloodwork if it is within 6 months of last bloodwork. He will be seeing PCP in November with bloodwork. Advised to notify me if any changes in renal function at that time. Thought process was discussed with the patient.   Thank you for the referral.  Please do not hesitate to contact me if you have any questions or concerns        Megan Yoder, DO  [facility]

## 2021-09-23 LAB
CHOLESTEROL, TOTAL: 156 MG/DL
CHOLESTEROL/HDL RATIO: NORMAL
CREATININE, URINE: NORMAL
HDLC SERPL-MCNC: 37 MG/DL (ref 35–70)
LDL CHOLESTEROL CALCULATED: 79 MG/DL (ref 0–160)
MICROALBUMIN/CREAT 24H UR: NORMAL MG/G{CREAT}
MICROALBUMIN/CREAT UR-RTO: 7.3
NONHDLC SERPL-MCNC: NORMAL MG/DL
TRIGL SERPL-MCNC: 199 MG/DL
VLDLC SERPL CALC-MCNC: NORMAL MG/DL

## 2021-11-05 ENCOUNTER — NURSE ONLY (OUTPATIENT)
Dept: LAB | Age: 81
End: 2021-11-05

## 2021-11-05 DIAGNOSIS — E11.9 TYPE 2 DIABETES MELLITUS WITHOUT COMPLICATION, WITHOUT LONG-TERM CURRENT USE OF INSULIN (HCC): ICD-10-CM

## 2021-11-05 LAB
ANION GAP SERPL CALCULATED.3IONS-SCNC: 13 MEQ/L (ref 8–16)
AVERAGE GLUCOSE: 183 MG/DL (ref 70–126)
BUN BLDV-MCNC: 39 MG/DL (ref 7–22)
CALCIUM SERPL-MCNC: 9.9 MG/DL (ref 8.5–10.5)
CHLORIDE BLD-SCNC: 102 MEQ/L (ref 98–111)
CO2: 27 MEQ/L (ref 23–33)
CREAT SERPL-MCNC: 1.4 MG/DL (ref 0.4–1.2)
GFR SERPL CREATININE-BSD FRML MDRD: 49 ML/MIN/1.73M2
GLUCOSE BLD-MCNC: 141 MG/DL (ref 70–108)
HBA1C MFR BLD: 8.1 % (ref 4.4–6.4)
POTASSIUM SERPL-SCNC: 4.5 MEQ/L (ref 3.5–5.2)
SODIUM BLD-SCNC: 142 MEQ/L (ref 135–145)

## 2021-11-10 ENCOUNTER — OFFICE VISIT (OUTPATIENT)
Dept: INTERNAL MEDICINE CLINIC | Age: 81
End: 2021-11-10
Payer: MEDICARE

## 2021-11-10 VITALS
WEIGHT: 181.2 LBS | DIASTOLIC BLOOD PRESSURE: 74 MMHG | TEMPERATURE: 97.2 F | HEIGHT: 71 IN | SYSTOLIC BLOOD PRESSURE: 126 MMHG | BODY MASS INDEX: 25.37 KG/M2 | HEART RATE: 60 BPM

## 2021-11-10 DIAGNOSIS — E78.2 MIXED HYPERLIPIDEMIA: ICD-10-CM

## 2021-11-10 DIAGNOSIS — N18.31 STAGE 3A CHRONIC KIDNEY DISEASE (HCC): ICD-10-CM

## 2021-11-10 DIAGNOSIS — E11.9 TYPE 2 DIABETES MELLITUS WITHOUT COMPLICATION, WITHOUT LONG-TERM CURRENT USE OF INSULIN (HCC): Primary | ICD-10-CM

## 2021-11-10 DIAGNOSIS — I10 ESSENTIAL HYPERTENSION: ICD-10-CM

## 2021-11-10 DIAGNOSIS — N18.31 TYPE 2 DIABETES MELLITUS WITH STAGE 3A CHRONIC KIDNEY DISEASE, WITHOUT LONG-TERM CURRENT USE OF INSULIN (HCC): ICD-10-CM

## 2021-11-10 DIAGNOSIS — E11.22 TYPE 2 DIABETES MELLITUS WITH STAGE 3A CHRONIC KIDNEY DISEASE, WITHOUT LONG-TERM CURRENT USE OF INSULIN (HCC): ICD-10-CM

## 2021-11-10 PROCEDURE — G8417 CALC BMI ABV UP PARAM F/U: HCPCS | Performed by: STUDENT IN AN ORGANIZED HEALTH CARE EDUCATION/TRAINING PROGRAM

## 2021-11-10 PROCEDURE — 4040F PNEUMOC VAC/ADMIN/RCVD: CPT | Performed by: STUDENT IN AN ORGANIZED HEALTH CARE EDUCATION/TRAINING PROGRAM

## 2021-11-10 PROCEDURE — 1123F ACP DISCUSS/DSCN MKR DOCD: CPT | Performed by: STUDENT IN AN ORGANIZED HEALTH CARE EDUCATION/TRAINING PROGRAM

## 2021-11-10 PROCEDURE — 99214 OFFICE O/P EST MOD 30 MIN: CPT | Performed by: STUDENT IN AN ORGANIZED HEALTH CARE EDUCATION/TRAINING PROGRAM

## 2021-11-10 PROCEDURE — 3052F HG A1C>EQUAL 8.0%<EQUAL 9.0%: CPT | Performed by: STUDENT IN AN ORGANIZED HEALTH CARE EDUCATION/TRAINING PROGRAM

## 2021-11-10 PROCEDURE — G8482 FLU IMMUNIZE ORDER/ADMIN: HCPCS | Performed by: STUDENT IN AN ORGANIZED HEALTH CARE EDUCATION/TRAINING PROGRAM

## 2021-11-10 PROCEDURE — 1036F TOBACCO NON-USER: CPT | Performed by: STUDENT IN AN ORGANIZED HEALTH CARE EDUCATION/TRAINING PROGRAM

## 2021-11-10 PROCEDURE — G8427 DOCREV CUR MEDS BY ELIG CLIN: HCPCS | Performed by: STUDENT IN AN ORGANIZED HEALTH CARE EDUCATION/TRAINING PROGRAM

## 2021-11-10 RX ORDER — METOPROLOL TARTRATE 50 MG/1
25 TABLET, FILM COATED ORAL 2 TIMES DAILY
Qty: 90 TABLET | Refills: 1 | Status: SHIPPED | OUTPATIENT
Start: 2021-11-10 | End: 2022-03-16 | Stop reason: SDUPTHER

## 2021-11-10 RX ORDER — FENOFIBRATE 145 MG/1
145 TABLET, COATED ORAL DAILY
Qty: 90 TABLET | Refills: 1 | Status: SHIPPED | OUTPATIENT
Start: 2021-11-10 | End: 2022-03-16 | Stop reason: SDUPTHER

## 2021-11-10 RX ORDER — PIOGLITAZONEHYDROCHLORIDE 30 MG/1
30 TABLET ORAL DAILY
Qty: 30 TABLET | Refills: 3 | Status: SHIPPED | OUTPATIENT
Start: 2021-11-10 | End: 2022-04-13

## 2021-11-10 RX ORDER — SEMAGLUTIDE 1.34 MG/ML
0.25 INJECTION, SOLUTION SUBCUTANEOUS WEEKLY
Qty: 4 PEN | Refills: 3 | Status: SHIPPED | OUTPATIENT
Start: 2021-11-10 | End: 2021-12-08 | Stop reason: SDUPTHER

## 2021-11-10 RX ORDER — ATORVASTATIN CALCIUM 80 MG/1
80 TABLET, FILM COATED ORAL DAILY
Qty: 90 TABLET | Refills: 1 | Status: SHIPPED | OUTPATIENT
Start: 2021-11-10 | End: 2022-03-16 | Stop reason: SDUPTHER

## 2021-11-10 RX ORDER — LISINOPRIL 2.5 MG/1
2.5 TABLET ORAL DAILY
Qty: 90 TABLET | Refills: 1 | Status: SHIPPED | OUTPATIENT
Start: 2021-11-10 | End: 2022-03-16 | Stop reason: SDUPTHER

## 2021-11-10 ASSESSMENT — ENCOUNTER SYMPTOMS
RHINORRHEA: 0
BLOOD IN STOOL: 0
NAUSEA: 0
ABDOMINAL PAIN: 0
DIARRHEA: 0
WHEEZING: 0
EYE REDNESS: 0
COUGH: 0
SORE THROAT: 0
BACK PAIN: 0
SHORTNESS OF BREATH: 0

## 2021-11-10 NOTE — PROGRESS NOTES
After pharmacist chart review, the following recommendations are made:    -Consider addition of GLP-1 agonist for CV and renal benefits   -Ozempic is preferred through the [address] [place]  formulary    -Order & chart notes should be faxed to the [address] [place] [address] in place:  No   Intervention Detail: New Rx: 1, reason: Needs Additional Therapy   Total # of Interventions Recommended: 1   Total # of Interventions Accepted: 1   Time Spent (min): Arie Escamilla (Shayne Back) Fany Kothari, PharmD, [facility]  Internal Medicine Clinical Pharmacist  [phone]
Refills faxed to [facility].
motion. Cervical back: Normal range of motion and neck supple. Right lower leg: No edema. Left lower leg: No edema. Skin:     General: Skin is warm and dry. Capillary Refill: Capillary refill takes less than 2 seconds. Neurological:      General: No focal deficit present. Mental Status: He is alert and oriented to person, place, and time. Psychiatric:         Behavior: Behavior normal.         Thought Content: Thought content normal.            On this date 11/10/2021 I have spent 30 minutes reviewing previous notes, test results and face to face with the patient discussing the diagnosis and importance of compliance with the treatment plan as well as documenting on the day of the visit. An electronic signature was used to authenticate this note. --Emerald Botello MD Resident Physician PGY-2  Supervised by Dr. Lianne Weems.  [address]  Dept: [phone]  Dept Fax: 93 : [phone]

## 2021-11-22 ENCOUNTER — OFFICE VISIT (OUTPATIENT)
Dept: INTERNAL MEDICINE CLINIC | Age: 81
End: 2021-11-22
Payer: MEDICARE

## 2021-11-22 DIAGNOSIS — N18.31 TYPE 2 DIABETES MELLITUS WITH STAGE 3A CHRONIC KIDNEY DISEASE, WITHOUT LONG-TERM CURRENT USE OF INSULIN (HCC): Primary | ICD-10-CM

## 2021-11-22 DIAGNOSIS — E11.22 TYPE 2 DIABETES MELLITUS WITH STAGE 3A CHRONIC KIDNEY DISEASE, WITHOUT LONG-TERM CURRENT USE OF INSULIN (HCC): Primary | ICD-10-CM

## 2021-11-22 PROCEDURE — 99999 PR OFFICE/OUTPT VISIT,PROCEDURE ONLY: CPT | Performed by: DIETITIAN, REGISTERED

## 2021-11-22 PROCEDURE — 97802 MEDICAL NUTRITION INDIV IN: CPT | Performed by: DIETITIAN, REGISTERED

## 2021-11-22 NOTE — PROGRESS NOTES
[address] [place] [address]. [address] [place] [address] Max., [facility], [address]  [phone] (phone)  [phone] (fax)    Patient Name: Guy Zuniga. Date of Birth: [id number]. MRN: [id number]      Assessment: Patient is a 80 y.o. male seen for Initial MNT visit for Diabetes. -Nutritionally relevant labs:   Lab Results   Component Value Date/Time    LABA1C 8.1 (H) 11/05/2021 10:57 AM    LABA1C 7.2 (H) 08/02/2021 09:21 AM    LABA1C 7.3 04/14/2021 12:00 AM    GLUCOSE 141 (H) 11/05/2021 09:55 AM    GLUCOSE 166 (H) 06/29/2021 09:40 AM    GLUCOSE 95 06/14/2018 10:06 AM    GLUCOSE 270 (H) 07/09/2015 11:20 AM    CHOL 156 09/23/2021 12:00 AM    HDL 37 09/23/2021 12:00 AM    LDLCALC 79 09/23/2021 12:00 AM    TRIG 199 09/23/2021 12:00 AM     -Blood sugar trends:check: FBS 146mg/dL    -Food recall:   Breakfast: cheerios yogurt /egg w/ 2 pc toast/ BB muffin Yogurt. Am snack: none  Lunch: turkey/ham sandwich, chips or pretzels/ 6 in pizza (supreme)  Afternoon snack: none   Dinner: burgers/pork cutlet/steack/chicken with veggies, corn and beans, potatoes.    Snacks: pretzels, cheese its    -Main Beverages: diet pop, water,     -  Current Outpatient Medications on File Prior to Visit   Medication Sig Dispense Refill    fenofibrate (TRICOR) 145 MG tablet Take 1 tablet by mouth daily 90 tablet 1    atorvastatin (LIPITOR) 80 MG tablet Take 1 tablet by mouth daily 90 tablet 1    metoprolol tartrate (LOPRESSOR) 50 MG tablet Take 0.5 tablets by mouth 2 times daily 90 tablet 1    lisinopril (PRINIVIL;ZESTRIL) 2.5 MG tablet Take 1 tablet by mouth daily 90 tablet 1    Semaglutide,0.25 or 0.5MG/DOS, (OZEMPIC, 0.25 OR 0.5 MG/DOSE,) 2 MG/1.5ML SOPN Inject 0.25 each into the skin once a week 4 pen 3    pioglitazone (ACTOS) 30 MG tablet Take 1 tablet by mouth daily 30 tablet 3    metFORMIN (GLUCOPHAGE) 1000 MG tablet Take 500 mg by mouth 2 times daily (with meals)       Cyanocobalamin understanding.  -Readiness to change: action - ready to set action plan and implement dietary changes. -Expected compliance: good. Thank you for your referral of this patient. Total time involved in direct patient education: 60 minutes for initial MNT visit.

## 2021-12-08 ENCOUNTER — OFFICE VISIT (OUTPATIENT)
Dept: INTERNAL MEDICINE CLINIC | Age: 81
End: 2021-12-08
Payer: MEDICARE

## 2021-12-08 VITALS
WEIGHT: 181.6 LBS | BODY MASS INDEX: 25.42 KG/M2 | SYSTOLIC BLOOD PRESSURE: 130 MMHG | TEMPERATURE: 97.3 F | HEART RATE: 64 BPM | DIASTOLIC BLOOD PRESSURE: 60 MMHG | HEIGHT: 71 IN

## 2021-12-08 DIAGNOSIS — E11.65 TYPE 2 DIABETES MELLITUS WITH HYPERGLYCEMIA, WITHOUT LONG-TERM CURRENT USE OF INSULIN (HCC): Primary | ICD-10-CM

## 2021-12-08 DIAGNOSIS — E78.00 PURE HYPERCHOLESTEROLEMIA: ICD-10-CM

## 2021-12-08 DIAGNOSIS — I10 PRIMARY HYPERTENSION: ICD-10-CM

## 2021-12-08 PROBLEM — E11.22 TYPE 2 DIABETES MELLITUS WITH CHRONIC KIDNEY DISEASE (HCC): Status: ACTIVE | Noted: 2021-12-08

## 2021-12-08 PROCEDURE — 99214 OFFICE O/P EST MOD 30 MIN: CPT

## 2021-12-08 PROCEDURE — 3052F HG A1C>EQUAL 8.0%<EQUAL 9.0%: CPT

## 2021-12-08 PROCEDURE — 4040F PNEUMOC VAC/ADMIN/RCVD: CPT

## 2021-12-08 PROCEDURE — G8427 DOCREV CUR MEDS BY ELIG CLIN: HCPCS

## 2021-12-08 PROCEDURE — G8417 CALC BMI ABV UP PARAM F/U: HCPCS

## 2021-12-08 PROCEDURE — 1036F TOBACCO NON-USER: CPT

## 2021-12-08 PROCEDURE — G8482 FLU IMMUNIZE ORDER/ADMIN: HCPCS

## 2021-12-08 PROCEDURE — 1123F ACP DISCUSS/DSCN MKR DOCD: CPT

## 2021-12-08 RX ORDER — SEMAGLUTIDE 1.34 MG/ML
INJECTION, SOLUTION SUBCUTANEOUS
Qty: 4 PEN | Refills: 1 | Status: SHIPPED | OUTPATIENT
Start: 2021-12-08 | End: 2022-01-19 | Stop reason: SDUPTHER

## 2021-12-08 NOTE — PROGRESS NOTES
Attending supervising physicians attestation statement:      I was present with the resident physician during the history and exam.  I Discussed the findings and plans with the resident physician  personally   After examining the patient, and agree with the resident'S note as outlined .         Electronically signed by Jeanine Cornell MD on 12/8/2021 at 5:15 PM

## 2021-12-08 NOTE — PROGRESS NOTES
[address] [facility] Internal Medicine   [facility] [address]  [facility], [address]  Dept: [phone]  Dept Fax: [phone]    This is an established patient, here for evaluation of the following chief complaint(s):  Chief Complaint   Patient presents with    Diabetes     ozempic added at last visit           ASSESSMENT AND PLAN     1. Non-insulin dependent type 2 diabetes mellitus  -Last A1C 8.1 in November 2021  -[facility] exam completed 1 week ago at [facility]-showed beginnings of cataracts but otherwise no blurriness of vision  -Last foot exam in 10/21  -Will continue metformin 500 mg BID  -Currently seeing a dietician   -Will increase Ozempic to 0.5 mg/dose after Michelle as patient had Ayanna Garcia started on 11/23  -Continue Actos current dose  -Next A1C due in 2/2022  -Continue to bring in glucose readings for every visit    2. CKD Stage III  -Last creatinine 1.4  -Due for follow-up appointment with Dr. Charlie Shay in the new year  -Continue lisinopril 2.5 mg daily    3. Hyperlipidemia  -Last LDL 79 in 9/2021  -Continue lipitor 80 mg daily    4. Hypertriglyceridemia  -Last triglyceride check 199 in 9/2021  -Continue fenofibrate    5. Essential hypertension  -Blood pressures within normal limits  -Continue metoprolol 25 mg BID  -Continue lisinopril 2.5 mg daily    6. Chronic systolic heart failure with reduced ejection fraction 45-50% (4/08)  -EF 45-50% on heart cath in 4/08  -Continue lisinopril and metoprolol  -Daily weights  -Monitor I's and O's  -Continue aspirin  -Continue nitroglycerin prn for any chest pain    7. Vitamin B12 deficiency  -Continue vitamin B12 tablets    8. Vitamin D deficiency  -Continue vitamin D tablets    Disposition:  Return in about 6 weeks (around 1/19/2022).  with Ruth Oneill, DO HATHAWAY     Rachel Rehman (1940) is a pleasant 80 y.o. male, former smoker PMH type 2 diabetes mellitus, CKD stage III, HLD, hypertriglyceridemia, essential hypertension, and chronic systolic heart failure with last ejection fraction 45-50% here for a follow-up visit for type 2 diabetes mellitus. The patient reports that his sugars have been improving since being seen by the dietician and being started on Actos and Ozempic. The patient typically checks his blood sugars once or twice a day and recently had an eye exam at the [place] about 1 week ago that showed the possible beginnings of cataracts even though his vision didn't change. The patient denies any neuropathy at this time and last had a foot exam in October 2021. Of note the patient has been on Ozempic since 11/23/21. The patient's blood sugars have been running on average 157 mg/dL with the highest value being 193 mg/dL and the lowest value being 113 mg/dL. The patient's last A1C in 11/21 was 8.1, and a previous A1C in August 2021 was 7.2. The patient currently does not drink any alcohol at this time and hasn't had a cigarette in close to 20 years. The patient's last creatinine was 1.4, and the patient currently sees Dr. Mikaela Guerra for nephrology and has an appointment scheduled in the new year. Of note the patient previously had a colonoscopy 3 weeks ago where some polyps were removed.          ROS  Constitutional: Denies any recent wt change, denies any new weakness or fatigue, denies fevers, denies night sweats  Eyes:  Denies any blurring or double vision  Ears/Nose/Mouth/Throat:  Denies any chronic sinus/rhinitis, sore throats  Cardiovascular: Denies any chest pain, palpitations, leg swelling  Respiratory:  Denies any frequent cough, wheezing or shortness of breath  Genitourinary:  Denies difficulty with urination, denies blood in urine, denies foamy urine  Gastrointestinal:  Denies any new abdominal pain, denies changes in stool frequency, denies blood in stool  Musculoskeletal:  Denies any joint pain, back pain, or difficulty walking  Integumentary:  Denies any rash or lesions  Neurological:  Some numbness and tingling in the feet  Endocrine: Denies any heat or cold interolerance   Hematologic/Lymphatic:  Denies any new bruises, denies any new lumps or bumps    Past Medical History:  has a past medical history of CAD (coronary artery disease), Hyperlipidemia, Hypertension, Left ventricular dysfunction, and Type II or unspecified type diabetes mellitus without mention of complication, not stated as uncontrolled. .     Past Surgical History:  has a past surgical history that includes Tonsillectomy (at the age of 13 yrs old) and Adenoidectomy (at the age of 13 yrs ). .     Family History: family history includes Prostate Cancer in his paternal uncle. .     Social History:  reports that he quit smoking about 29 years ago. His smoking use included cigarettes. He has a 20.00 pack-year smoking history. He has never used smokeless tobacco. He reports that he does not drink alcohol and does not use drugs. .     Allergies: has No Known Allergies. .     Current Medications:  Outpatient Medications Marked as Taking for the 12/8/21 encounter (Office Visit) with Cindy Anderson, DO   Medication Sig Dispense Refill    fenofibrate (TRICOR) 145 MG tablet Take 1 tablet by mouth daily 90 tablet 1    atorvastatin (LIPITOR) 80 MG tablet Take 1 tablet by mouth daily 90 tablet 1    metoprolol tartrate (LOPRESSOR) 50 MG tablet Take 0.5 tablets by mouth 2 times daily 90 tablet 1    lisinopril (PRINIVIL;ZESTRIL) 2.5 MG tablet Take 1 tablet by mouth daily 90 tablet 1    Semaglutide,0.25 or 0.5MG/DOS, (OZEMPIC, 0.25 OR 0.5 MG/DOSE,) 2 MG/1.5ML SOPN Inject 0.25 each into the skin once a week 4 pen 3    pioglitazone (ACTOS) 30 MG tablet Take 1 tablet by mouth daily 30 tablet 3    metFORMIN (GLUCOPHAGE) 1000 MG tablet Take 500 mg by mouth 2 times daily (with meals)       Cyanocobalamin (VITAMIN B 12 PO) Take 1,000 mg by mouth daily      vitamin D (CHOLECALCIFEROL) 25 MCG (1000 UT) TABS tablet Take 1,000 Units by mouth daily      Glucose Blood (BLOOD GLUCOSE TEST STRIPS) STRP Test Blood Sugars once daily. Accu-Check test strips. Dx E11.9 100 strip 3    nitroGLYCERIN (NITROSTAT) 0.4 MG SL tablet Place 1 tablet under the tongue every 5 minutes as needed. 25 tablet 5    Multiple Vitamin (MULTI-VITAMIN PO) Take  by mouth.  aspirin 81 MG tablet Take 81 mg by mouth daily. PHYSICAL EXAMINATION     Vitals:    12/08/21 1253   BP: 130/60   Pulse: 64   Temp: 97.3 °F (36.3 °C)     Body mass index is 25.34 kg/m². Constitutional: In no acute distress. Appears documented age. Pleasant and cooperative. HENT:   Head: Normocephalic and atraumatic. Mouth/Throat: Oropharynx is clear and moist.  Eyes: Conjunctivae are normal. Pupils are equal, round, and reactive to light. No scleral icterus. Neck: Neck supple. No JVD present. Cardiovascular: Normal rate, regular rhythm, normal heart sounds. No murmur heard. Pulmonary/Chest: Effort normal and breath sounds normal. No stridor. No respiratory distress. No wheezes. No rales. Patient exhibits no tenderness. Abdominal: Soft. Patient exhibits no distension. No tenderness. Bowel sounds present. Musculoskeletal: Normal range of motion. Extremities: Patient exhibits no edema and no tenderness. Lymphadenopathy: No cervical adenopathy. Neurological: Patient is alert and oriented to person, place, and time. Skin: Skin is warm and dry. Patient is not diaphoretic. No rashes or lesions. Psychiatric: Patient has a normal mood and affect. Patient behavior is normal.    MOST RECENT DIAGNOSTIC DATA     Recent Labs:  Creatinine 1.4  A1C 8.1  Average glucose 157  Microalbumin: creatinine 7.3      An electronic signature was used to authenticate this note. Chart reviewed with and plan created in collaboration with attending physician, Dr. Richie Salinas. All orders placed were reviewed by the attending physician prior to signing.     Electronically signed by Kia Schroeder DO on 12/8/2021 at 1:12 PM  Internal Medicine PGY-1  [address] [facility] [place], [address]

## 2021-12-08 NOTE — PROGRESS NOTES
After pharmacist chart review, the following recommendations are made:    -Ensure that Red Span increases from Ozempic 0.25mg weekly to 0.5mg weekly. Ozempic 0.25mg is a 4 week titration dose.     For Pharmacy Admin Tracking Only   CPA in place:  No   Intervention Detail: Dose Adjustment: 1, reason: Therapy Optimization   Total # of Interventions Recommended: 1   Total # of Interventions Accepted: 1   Time Spent (min): [address] ([address]ariana Loredo) La Zambrano, PharmD, [facility]  Internal Medicine Clinical Pharmacist  [phone]  ,

## 2022-01-19 ENCOUNTER — OFFICE VISIT (OUTPATIENT)
Dept: INTERNAL MEDICINE CLINIC | Age: 82
End: 2022-01-19
Payer: MEDICARE

## 2022-01-19 VITALS
TEMPERATURE: 97.3 F | WEIGHT: 178 LBS | HEART RATE: 66 BPM | SYSTOLIC BLOOD PRESSURE: 134 MMHG | DIASTOLIC BLOOD PRESSURE: 70 MMHG | HEIGHT: 71 IN | BODY MASS INDEX: 24.92 KG/M2

## 2022-01-19 DIAGNOSIS — E11.65 TYPE 2 DIABETES MELLITUS WITH HYPERGLYCEMIA, WITHOUT LONG-TERM CURRENT USE OF INSULIN (HCC): Primary | ICD-10-CM

## 2022-01-19 DIAGNOSIS — E78.2 MIXED HYPERLIPIDEMIA: ICD-10-CM

## 2022-01-19 DIAGNOSIS — N18.31 STAGE 3A CHRONIC KIDNEY DISEASE (HCC): ICD-10-CM

## 2022-01-19 DIAGNOSIS — I10 PRIMARY HYPERTENSION: ICD-10-CM

## 2022-01-19 PROCEDURE — G8420 CALC BMI NORM PARAMETERS: HCPCS | Performed by: STUDENT IN AN ORGANIZED HEALTH CARE EDUCATION/TRAINING PROGRAM

## 2022-01-19 PROCEDURE — 99214 OFFICE O/P EST MOD 30 MIN: CPT | Performed by: STUDENT IN AN ORGANIZED HEALTH CARE EDUCATION/TRAINING PROGRAM

## 2022-01-19 PROCEDURE — G8482 FLU IMMUNIZE ORDER/ADMIN: HCPCS | Performed by: STUDENT IN AN ORGANIZED HEALTH CARE EDUCATION/TRAINING PROGRAM

## 2022-01-19 PROCEDURE — 1123F ACP DISCUSS/DSCN MKR DOCD: CPT | Performed by: STUDENT IN AN ORGANIZED HEALTH CARE EDUCATION/TRAINING PROGRAM

## 2022-01-19 PROCEDURE — 4040F PNEUMOC VAC/ADMIN/RCVD: CPT | Performed by: STUDENT IN AN ORGANIZED HEALTH CARE EDUCATION/TRAINING PROGRAM

## 2022-01-19 PROCEDURE — 1036F TOBACCO NON-USER: CPT | Performed by: STUDENT IN AN ORGANIZED HEALTH CARE EDUCATION/TRAINING PROGRAM

## 2022-01-19 PROCEDURE — G8427 DOCREV CUR MEDS BY ELIG CLIN: HCPCS | Performed by: STUDENT IN AN ORGANIZED HEALTH CARE EDUCATION/TRAINING PROGRAM

## 2022-01-19 RX ORDER — SEMAGLUTIDE 1.34 MG/ML
INJECTION, SOLUTION SUBCUTANEOUS
Qty: 4 PEN | Refills: 2 | Status: SHIPPED | OUTPATIENT
Start: 2022-01-19 | End: 2022-03-16

## 2022-01-19 RX ORDER — SEMAGLUTIDE 1.34 MG/ML
INJECTION, SOLUTION SUBCUTANEOUS
Status: CANCELLED | OUTPATIENT
Start: 2022-01-19

## 2022-01-19 ASSESSMENT — ENCOUNTER SYMPTOMS
RHINORRHEA: 0
WHEEZING: 0
BACK PAIN: 0
SHORTNESS OF BREATH: 0
COUGH: 0
ABDOMINAL PAIN: 0
SORE THROAT: 0
DIARRHEA: 0
CONSTIPATION: 0
VOMITING: 0
EYE REDNESS: 0
FACIAL SWELLING: 0

## 2022-01-19 NOTE — PROGRESS NOTES
Aisha Mason (:  1940) is a 80 y.o. male,Established patient, here for evaluation of the following chief complaint(s):  Diabetes (-[facility] sent 1 mg. [facility] and he took 1 mg. and had vomitting all day after shot, the next day and some the next day), Chronic Kidney Disease (followed by dr Taisha Siegel), Hyperlipidemia, and Hypertension         ASSESSMENT/PLAN:  1. Type 2 diabetes mellitus with hyperglycemia, without long-term current use of insulin (HCC)  -     Semaglutide,0.25 or 0.5MG/DOS, (OZEMPIC, 0.25 OR 0.5 MG/DOSE,) 2 MG/1.5ML SOPN; Inject 0.25mg weekly for 4 weeks, then increase to 0.5mg weekly  *Multiple doses per pen*, Disp-4 pen, R-2Print  2. Primary hypertension  3. Stage 3a chronic kidney disease ([facility] [address]  4. Mixed hyperlipidemia      Return in about 8 weeks (around 3/16/2022). Subjective   SUBJECTIVE/OBJECTIVE:  HPI    Mr. Kyung Rose is a 81 yo male here for 1 month follow-up. Patient was started on Ozempic 0.25 mg around 2 months ago. Patient was ready to go up to 0.5 mg and was given a prescription for 0.5 mg at the last office visit, however the [place] sent patient the 1 mg syringes and this caused severe nausea and vomiting.     Diabetes Mellitus 2:   · Last HgbA1c 8.1 (Prior 7.2 2021)  · On Metformin 500 mg BID   · On Actos 30 mg daily  · Will restart Ozempic 0.25 mg for 4 weeks and titrate up accordingly.   · Last Eye exam 2022 at the [place] - early signs of cataracts / no retinopathy  · Last Foot exam 10/2021  · Seen by dietitian - on diabetic diet  · A1c at next visit    Primary Hypertension:  · Controlled  · Metoprolol 25 mg BID and lisinopril 2.5 mg daily      CKD Stage 3:    · Cr elevated at 1.4 (Prior 1.1 2021)   · Lisinopril 2.5 mg daily   · Followed by Dr. Pam Valero     Chronic Systolic HF w/ reduced EF 45-50% (2008):  ·       EF 45-50% on heart cath 2008  ·       On Lisinopril and metoprolol  ·       Daily weights     Hyperlipidemia:   · LDL 79 (Prior 116 08/2021)  · Lipitor 80 mg daily     Hypertriglyceridemia:  · Triglycerides 199 (Prior 275 08/2021)  · Fenofibrate 145 mg initiated           Review of Systems   Constitutional: Negative for chills, diaphoresis and fatigue. HENT: Negative for congestion, facial swelling, rhinorrhea and sore throat. Eyes: Negative for redness and visual disturbance. Respiratory: Negative for cough, shortness of breath and wheezing. Cardiovascular: Negative for chest pain, palpitations and leg swelling. Gastrointestinal: Negative for abdominal pain, constipation, diarrhea and vomiting. Endocrine: Negative for polydipsia and polyuria. Genitourinary: Negative for dysuria, flank pain, hematuria and urgency. Musculoskeletal: Negative for back pain and myalgias. Skin: Negative for pallor and rash. Neurological: Negative for seizures, syncope, weakness and headaches. Hematological: Negative for adenopathy. Psychiatric/Behavioral: Negative for confusion and self-injury. The patient is not hyperactive. Objective   Physical Exam  Constitutional:       General: He is not in acute distress. Appearance: Normal appearance. He is not toxic-appearing. HENT:      Head: Normocephalic and atraumatic. Right Ear: External ear normal.      Left Ear: External ear normal.   Eyes:      General: No scleral icterus. Right eye: No discharge. Left eye: No discharge. Cardiovascular:      Rate and Rhythm: Normal rate and regular rhythm. Pulses: Normal pulses. Heart sounds: Normal heart sounds. No murmur heard. No friction rub. No gallop. Pulmonary:      Effort: Pulmonary effort is normal. No respiratory distress. Breath sounds: Normal breath sounds. No wheezing or rales. Abdominal:      General: Abdomen is flat. Bowel sounds are normal. There is no distension. Palpations: Abdomen is soft. Tenderness: There is no abdominal tenderness. There is no guarding. Musculoskeletal:         General: Normal range of motion. Cervical back: Normal range of motion. Right lower leg: No edema. Left lower leg: No edema. Skin:     General: Skin is warm and dry. Capillary Refill: Capillary refill takes less than 2 seconds. Neurological:      General: No focal deficit present. Mental Status: He is alert and oriented to person, place, and time. Psychiatric:         Behavior: Behavior normal.         Thought Content: Thought content normal.            On this date 1/19/2022 I have spent 30 minutes reviewing previous notes, test results and face to face with the patient discussing the diagnosis and importance of compliance with the treatment plan as well as documenting on the day of the visit. An electronic signature was used to authenticate this note. --Frankie Kelley MD   Supervised by Dr. Stacie Gil. [place].  [address]  Dept: [phone]  Dept Fax: [phone] : [phone]

## 2022-03-16 ENCOUNTER — OFFICE VISIT (OUTPATIENT)
Dept: INTERNAL MEDICINE CLINIC | Age: 82
End: 2022-03-16
Payer: MEDICARE

## 2022-03-16 VITALS
TEMPERATURE: 97.2 F | BODY MASS INDEX: 26.1 KG/M2 | HEIGHT: 71 IN | HEART RATE: 62 BPM | WEIGHT: 186.4 LBS | SYSTOLIC BLOOD PRESSURE: 126 MMHG | DIASTOLIC BLOOD PRESSURE: 50 MMHG

## 2022-03-16 DIAGNOSIS — E11.65 TYPE 2 DIABETES MELLITUS WITH HYPERGLYCEMIA, WITHOUT LONG-TERM CURRENT USE OF INSULIN (HCC): Primary | ICD-10-CM

## 2022-03-16 DIAGNOSIS — E78.2 MIXED HYPERLIPIDEMIA: ICD-10-CM

## 2022-03-16 DIAGNOSIS — I10 ESSENTIAL HYPERTENSION: ICD-10-CM

## 2022-03-16 LAB — HBA1C MFR BLD: 6 % (ref 4.3–5.7)

## 2022-03-16 PROCEDURE — 4040F PNEUMOC VAC/ADMIN/RCVD: CPT | Performed by: STUDENT IN AN ORGANIZED HEALTH CARE EDUCATION/TRAINING PROGRAM

## 2022-03-16 PROCEDURE — 83036 HEMOGLOBIN GLYCOSYLATED A1C: CPT | Performed by: STUDENT IN AN ORGANIZED HEALTH CARE EDUCATION/TRAINING PROGRAM

## 2022-03-16 PROCEDURE — 99214 OFFICE O/P EST MOD 30 MIN: CPT | Performed by: STUDENT IN AN ORGANIZED HEALTH CARE EDUCATION/TRAINING PROGRAM

## 2022-03-16 PROCEDURE — 1123F ACP DISCUSS/DSCN MKR DOCD: CPT | Performed by: STUDENT IN AN ORGANIZED HEALTH CARE EDUCATION/TRAINING PROGRAM

## 2022-03-16 PROCEDURE — 1036F TOBACCO NON-USER: CPT | Performed by: STUDENT IN AN ORGANIZED HEALTH CARE EDUCATION/TRAINING PROGRAM

## 2022-03-16 PROCEDURE — 3044F HG A1C LEVEL LT 7.0%: CPT | Performed by: STUDENT IN AN ORGANIZED HEALTH CARE EDUCATION/TRAINING PROGRAM

## 2022-03-16 PROCEDURE — G8417 CALC BMI ABV UP PARAM F/U: HCPCS | Performed by: STUDENT IN AN ORGANIZED HEALTH CARE EDUCATION/TRAINING PROGRAM

## 2022-03-16 PROCEDURE — G8427 DOCREV CUR MEDS BY ELIG CLIN: HCPCS | Performed by: STUDENT IN AN ORGANIZED HEALTH CARE EDUCATION/TRAINING PROGRAM

## 2022-03-16 PROCEDURE — G8482 FLU IMMUNIZE ORDER/ADMIN: HCPCS | Performed by: STUDENT IN AN ORGANIZED HEALTH CARE EDUCATION/TRAINING PROGRAM

## 2022-03-16 RX ORDER — ATORVASTATIN CALCIUM 80 MG/1
80 TABLET, FILM COATED ORAL DAILY
Qty: 90 TABLET | Refills: 1 | Status: SHIPPED | OUTPATIENT
Start: 2022-03-16 | End: 2022-05-18 | Stop reason: SDUPTHER

## 2022-03-16 RX ORDER — FENOFIBRATE 145 MG/1
145 TABLET, COATED ORAL DAILY
Qty: 90 TABLET | Refills: 1 | Status: SHIPPED | OUTPATIENT
Start: 2022-03-16 | End: 2022-04-13 | Stop reason: SDUPTHER

## 2022-03-16 RX ORDER — METOPROLOL TARTRATE 50 MG/1
25 TABLET, FILM COATED ORAL 2 TIMES DAILY
Qty: 90 TABLET | Refills: 1 | Status: SHIPPED | OUTPATIENT
Start: 2022-03-16 | End: 2022-04-13 | Stop reason: SDUPTHER

## 2022-03-16 RX ORDER — PIOGLITAZONEHYDROCHLORIDE 30 MG/1
30 TABLET ORAL DAILY
Qty: 90 TABLET | Refills: 1 | Status: CANCELLED | OUTPATIENT
Start: 2022-03-16

## 2022-03-16 RX ORDER — LISINOPRIL 2.5 MG/1
2.5 TABLET ORAL DAILY
Qty: 90 TABLET | Refills: 1 | Status: SHIPPED | OUTPATIENT
Start: 2022-03-16 | End: 2022-04-13 | Stop reason: SDUPTHER

## 2022-03-16 ASSESSMENT — ENCOUNTER SYMPTOMS
RHINORRHEA: 0
BACK PAIN: 0
SORE THROAT: 0
WHEEZING: 0
ABDOMINAL PAIN: 0
DIARRHEA: 0
COUGH: 0
BLOOD IN STOOL: 0
SHORTNESS OF BREATH: 0
NAUSEA: 0

## 2022-03-16 ASSESSMENT — PATIENT HEALTH QUESTIONNAIRE - PHQ9
SUM OF ALL RESPONSES TO PHQ QUESTIONS 1-9: 0
1. LITTLE INTEREST OR PLEASURE IN DOING THINGS: 0
SUM OF ALL RESPONSES TO PHQ QUESTIONS 1-9: 0
SUM OF ALL RESPONSES TO PHQ9 QUESTIONS 1 & 2: 0
2. FEELING DOWN, DEPRESSED OR HOPELESS: 0
SUM OF ALL RESPONSES TO PHQ QUESTIONS 1-9: 0
SUM OF ALL RESPONSES TO PHQ QUESTIONS 1-9: 0

## 2022-03-16 NOTE — PROGRESS NOTES
Elza Carter (:  1940) is a [de-identified] y.o. male,Established patient, here for evaluation of the following chief complaint(s):  Diabetes, Hypertension, and Chronic Kidney Disease         ASSESSMENT/PLAN:  1. Type 2 diabetes mellitus with hyperglycemia, without long-term current use of insulin (HCC)  -     POCT glycosylated hemoglobin (Hb A1C)  -     Semaglutide,0.25 or 0.5MG/DOS, 2 MG/1.5ML SOPN; Inject 0.25 mg into the skin once a week, Disp-4 pen, R-1Print  2. Essential hypertension  -     lisinopril (PRINIVIL;ZESTRIL) 2.5 MG tablet; Take 1 tablet by mouth daily, Disp-90 tablet, R-1Print  -     metoprolol tartrate (LOPRESSOR) 50 MG tablet; Take 0.5 tablets by mouth 2 times daily, Disp-90 tablet, R-1Print  3. Mixed hyperlipidemia  -     atorvastatin (LIPITOR) 80 MG tablet; Take 1 tablet by mouth daily, Disp-90 tablet, R-1Print  -     fenofibrate (TRICOR) 145 MG tablet; Take 1 tablet by mouth daily, Disp-90 tablet, R-1Print      Return in about 4 weeks (around 2022). Plan:  1. Will restart Ozempic 0.25 mg for 4 weeks and titrate up accordingly. Subjective   SUBJECTIVE/OBJECTIVE:  HPI     Mr. Ronald Choi is a 81 yo male here for 1 month follow-up. Patient received the 1 mg Ozempic syringe from the [place] again which he has not taken. Patient has been only taking Metformin and Actos for the past few months. Reports feeling well.  No hypoglycemic episodes.     Diabetes Mellitus 2:   · HgbA1c 6.0 / No hypoglycemic episode / Average   · On Metformin 500 mg BID   · On Actos 30 mg daily  · Last Eye exam 2022 at the [place] - early signs of cataracts / no retinopathy  · Last Foot exam 10/2021  · Seen by dietitian - on diabetic diet     Primary Hypertension:  · Controlled  · Metoprolol 25 mg BID and lisinopril 2.5 mg daily      CKD Stage 3:    · Cr elevated at 1.4 (Prior 1.1 2021)   · Lisinopril 2.5 mg daily   · Followed by Dr. [facility]     Chronic Systolic HF w/ reduced EF 45-50% (04/2008): ·       EF 45-50% on heart cath 04/2008  ·       On Lisinopril and metoprolol  ·       Daily weights     Hyperlipidemia:   · LDL 79 (Prior 116 08/2021)  · Lipitor 80 mg daily     Hypertriglyceridemia:  · Triglycerides 199 (Prior 275 08/2021)  · Fenofibrate 145 mg initiated      Review of Systems   Constitutional: Negative for chills, fatigue and fever. HENT: Negative for congestion, hearing loss, rhinorrhea and sore throat. Eyes: Negative for visual disturbance. Respiratory: Negative for cough, shortness of breath and wheezing. Cardiovascular: Negative for chest pain, palpitations and leg swelling. Gastrointestinal: Negative for abdominal pain, blood in stool, diarrhea and nausea. Endocrine: Negative for polydipsia and polyuria. Genitourinary: Negative for dysuria, frequency, hematuria and urgency. Musculoskeletal: Negative for back pain and myalgias. Skin: Negative for pallor and rash. Neurological: Negative for dizziness, syncope, numbness and headaches. Hematological: Negative for adenopathy. Psychiatric/Behavioral: Negative for confusion, decreased concentration and dysphoric mood. Objective   Physical Exam  Constitutional:       General: He is not in acute distress. Appearance: Normal appearance. He is not ill-appearing or toxic-appearing. HENT:      Head: Normocephalic and atraumatic. Right Ear: External ear normal.      Left Ear: External ear normal.   Eyes:      General: No scleral icterus. Right eye: No discharge. Left eye: No discharge. Cardiovascular:      Rate and Rhythm: Normal rate and regular rhythm. Pulses: Normal pulses. Heart sounds: Normal heart sounds. No murmur heard. No friction rub. No gallop. Pulmonary:      Effort: Pulmonary effort is normal. No respiratory distress. Breath sounds: Normal breath sounds. No wheezing or rales. Abdominal:      General: Abdomen is flat.  Bowel sounds are normal. There is no distension. Palpations: Abdomen is soft. Tenderness: There is no abdominal tenderness. Musculoskeletal:         General: Normal range of motion. Cervical back: Normal range of motion. Right lower leg: No edema. Left lower leg: No edema. Skin:     General: Skin is warm and dry. Capillary Refill: Capillary refill takes less than 2 seconds. Neurological:      General: No focal deficit present. Mental Status: He is alert and oriented to person, place, and time. Psychiatric:         Behavior: Behavior normal.         Thought Content: Thought content normal.            On this date 3/16/2022 I have spent 30 minutes reviewing previous notes, test results and face to face with the patient discussing the diagnosis and importance of compliance with the treatment plan as well as documenting on the day of the visit. An electronic signature was used to authenticate this note. --Aundrea Judge MD   Supervised by Dr. Kimo Dykes. [facility].  [address]  Dept: [phone]  Dept Fax: [phone] : [phone]

## 2022-03-16 NOTE — PROGRESS NOTES
[facility] the [facility] Ozemipc 0.25/0.5mg weekly order; it has remained on file as Ozempic 1mg weekly. Coery Durham team nurse called back with clarification - Ozempic has been changed to 0.25mg/0.5mg syringe.     Provided sample to [facility] today of Ozempic 0.25/0.5mg pen (Lot: [id number] Exp: 3/2023) #1    For Hermelindo Newberry in place:  No   Recommendation Provided To: Patient/Caregiver: 1 via In person and Pharmacy: 1   Intervention Detail: Dose Adjustment: 1, reason: LOAN and Patient Access Assistance/Sample Provided   Gap Closed?: No    Intervention Accepted By: Patient/Caregiver: 1 and Pharmacy: 1   Time Spent (min): [address], PharmD, [facility]  Internal Medicine Clinical Pharmacist  [phone]

## 2022-04-13 ENCOUNTER — OFFICE VISIT (OUTPATIENT)
Dept: INTERNAL MEDICINE CLINIC | Age: 82
End: 2022-04-13
Payer: MEDICARE

## 2022-04-13 VITALS
WEIGHT: 184 LBS | DIASTOLIC BLOOD PRESSURE: 60 MMHG | TEMPERATURE: 98 F | HEART RATE: 72 BPM | BODY MASS INDEX: 25.67 KG/M2 | SYSTOLIC BLOOD PRESSURE: 116 MMHG

## 2022-04-13 DIAGNOSIS — E78.2 MIXED HYPERLIPIDEMIA: ICD-10-CM

## 2022-04-13 DIAGNOSIS — E11.65 TYPE 2 DIABETES MELLITUS WITH HYPERGLYCEMIA, WITHOUT LONG-TERM CURRENT USE OF INSULIN (HCC): ICD-10-CM

## 2022-04-13 DIAGNOSIS — I10 ESSENTIAL HYPERTENSION: Primary | ICD-10-CM

## 2022-04-13 PROCEDURE — G8417 CALC BMI ABV UP PARAM F/U: HCPCS | Performed by: STUDENT IN AN ORGANIZED HEALTH CARE EDUCATION/TRAINING PROGRAM

## 2022-04-13 PROCEDURE — 1123F ACP DISCUSS/DSCN MKR DOCD: CPT | Performed by: STUDENT IN AN ORGANIZED HEALTH CARE EDUCATION/TRAINING PROGRAM

## 2022-04-13 PROCEDURE — 99214 OFFICE O/P EST MOD 30 MIN: CPT | Performed by: STUDENT IN AN ORGANIZED HEALTH CARE EDUCATION/TRAINING PROGRAM

## 2022-04-13 PROCEDURE — 1036F TOBACCO NON-USER: CPT | Performed by: STUDENT IN AN ORGANIZED HEALTH CARE EDUCATION/TRAINING PROGRAM

## 2022-04-13 PROCEDURE — 3044F HG A1C LEVEL LT 7.0%: CPT | Performed by: STUDENT IN AN ORGANIZED HEALTH CARE EDUCATION/TRAINING PROGRAM

## 2022-04-13 PROCEDURE — G8427 DOCREV CUR MEDS BY ELIG CLIN: HCPCS | Performed by: STUDENT IN AN ORGANIZED HEALTH CARE EDUCATION/TRAINING PROGRAM

## 2022-04-13 PROCEDURE — 4040F PNEUMOC VAC/ADMIN/RCVD: CPT | Performed by: STUDENT IN AN ORGANIZED HEALTH CARE EDUCATION/TRAINING PROGRAM

## 2022-04-13 RX ORDER — FENOFIBRATE 145 MG/1
145 TABLET, COATED ORAL DAILY
Qty: 90 TABLET | Refills: 1 | Status: SHIPPED | OUTPATIENT
Start: 2022-04-13 | End: 2022-09-07 | Stop reason: SDUPTHER

## 2022-04-13 RX ORDER — LISINOPRIL 2.5 MG/1
2.5 TABLET ORAL DAILY
Qty: 90 TABLET | Refills: 1 | Status: SHIPPED | OUTPATIENT
Start: 2022-04-13 | End: 2022-09-07 | Stop reason: SDUPTHER

## 2022-04-13 RX ORDER — METOPROLOL TARTRATE 50 MG/1
25 TABLET, FILM COATED ORAL 2 TIMES DAILY
Qty: 90 TABLET | Refills: 1 | Status: SHIPPED | OUTPATIENT
Start: 2022-04-13 | End: 2022-09-07 | Stop reason: SDUPTHER

## 2022-04-13 ASSESSMENT — ENCOUNTER SYMPTOMS
ABDOMINAL PAIN: 0
SHORTNESS OF BREATH: 0
VOMITING: 0
NAUSEA: 0
ABDOMINAL DISTENTION: 0
RHINORRHEA: 0
CHEST TIGHTNESS: 0
COUGH: 0
DIARRHEA: 0
SINUS PAIN: 0

## 2022-04-13 NOTE — PROGRESS NOTES
Annika Lan (:  1940) is a [de-identified] y.o. male,Established patient, here for evaluation of the following chief complaint(s):  Diabetes (A1C 3/16/22), Hypertension, and Hyperlipidemia         ASSESSMENT/PLAN:  1. Essential hypertension  -     lisinopril (PRINIVIL;ZESTRIL) 2.5 MG tablet; Take 1 tablet by mouth daily, Disp-90 tablet, R-1Print  -     metoprolol tartrate (LOPRESSOR) 50 MG tablet; Take 0.5 tablets by mouth 2 times daily, Disp-90 tablet, R-1Print  2. Mixed hyperlipidemia  -     fenofibrate (TRICOR) 145 MG tablet; Take 1 tablet by mouth daily, Disp-90 tablet, R-1Print  3. Type 2 diabetes mellitus with hyperglycemia, without long-term current use of insulin (HCC)  -     Semaglutide,0.25 or 0.5MG/DOS, 2 MG/1.5ML SOPN; Inject 0.5 mg into the skin once a week, Disp-4 pen, R-1Normal  -     metFORMIN (GLUCOPHAGE) 1000 MG tablet; Take 0.5 tablets by mouth daily (with breakfast), Disp-60 tablet, R-1Normal    Patient seen and plan discussed with Dr. Joel Gutiérrez. Return in about 4 weeks (around 2022). Subjective   SUBJECTIVE/OBJECTIVE:  81 yo M with history of T2DM, HTN, HLD, CKD, here for 4-week routine follow up. Last visit was 2022 with Dr. Ming Banda and Dr. Joel Gutiérrez. Reports having sinusitis that lasted 1-2 weeks since his last visit. Endorses resolution of symptoms. No other new issues. Diabetes Mellitus 2:   Unable to download records. Admits to not checking BG during his sinusitis episode but continued to be compliant to meds. Taking Ozempic 0.25 mg weekly. Reports no issues including nausea/vomiting. Willing to uptitrate dose to 0.5 mg again. Per patient BG ranged 107-167. No hypoglycemic episodes.      · HgbA1c 6.0 (2022)  · On Metformin 500 mg BID - will reduce dose to 500 mg daily  · On Actos 30 mg daily  · Last Eye exam 2022 at the [address] [place] [address] of cataracts / no retinopathy  · Last Foot exam 10/2021  · Seen by dietitian - on diabetic diet       Primary swelling. Normal range of motion. Cervical back: Normal range of motion. Skin:     General: Skin is warm and dry. Neurological:      General: No focal deficit present. Mental Status: He is alert. Psychiatric:         Mood and Affect: Mood normal.            On this date 4/13/2022 I have spent 35 minutes reviewing previous notes, test results and face to face with the patient discussing the diagnosis and importance of compliance with the treatment plan as well as documenting on the day of the visit. An electronic signature was used to authenticate this note. --Mercedes Medina MD   . [address] INTERNAL MEDICINE  [address]  Dept: [phone]  Dept Fax: [phone] : [phone]

## 2022-04-18 DIAGNOSIS — E11.65 TYPE 2 DIABETES MELLITUS WITH HYPERGLYCEMIA, WITHOUT LONG-TERM CURRENT USE OF INSULIN (HCC): ICD-10-CM

## 2022-05-18 ENCOUNTER — OFFICE VISIT (OUTPATIENT)
Dept: INTERNAL MEDICINE CLINIC | Age: 82
End: 2022-05-18
Payer: MEDICARE

## 2022-05-18 VITALS
WEIGHT: 186.4 LBS | SYSTOLIC BLOOD PRESSURE: 136 MMHG | TEMPERATURE: 97.2 F | BODY MASS INDEX: 26.1 KG/M2 | HEIGHT: 71 IN | HEART RATE: 70 BPM | DIASTOLIC BLOOD PRESSURE: 60 MMHG

## 2022-05-18 DIAGNOSIS — E11.65 TYPE 2 DIABETES MELLITUS WITH HYPERGLYCEMIA, WITHOUT LONG-TERM CURRENT USE OF INSULIN (HCC): Primary | ICD-10-CM

## 2022-05-18 DIAGNOSIS — E78.2 MIXED HYPERLIPIDEMIA: ICD-10-CM

## 2022-05-18 DIAGNOSIS — N18.30 STAGE 3 CHRONIC KIDNEY DISEASE, UNSPECIFIED WHETHER STAGE 3A OR 3B CKD (HCC): ICD-10-CM

## 2022-05-18 PROCEDURE — 99214 OFFICE O/P EST MOD 30 MIN: CPT | Performed by: STUDENT IN AN ORGANIZED HEALTH CARE EDUCATION/TRAINING PROGRAM

## 2022-05-18 PROCEDURE — G8427 DOCREV CUR MEDS BY ELIG CLIN: HCPCS | Performed by: STUDENT IN AN ORGANIZED HEALTH CARE EDUCATION/TRAINING PROGRAM

## 2022-05-18 PROCEDURE — 3044F HG A1C LEVEL LT 7.0%: CPT | Performed by: STUDENT IN AN ORGANIZED HEALTH CARE EDUCATION/TRAINING PROGRAM

## 2022-05-18 PROCEDURE — 1123F ACP DISCUSS/DSCN MKR DOCD: CPT | Performed by: STUDENT IN AN ORGANIZED HEALTH CARE EDUCATION/TRAINING PROGRAM

## 2022-05-18 PROCEDURE — 1036F TOBACCO NON-USER: CPT | Performed by: STUDENT IN AN ORGANIZED HEALTH CARE EDUCATION/TRAINING PROGRAM

## 2022-05-18 PROCEDURE — G8417 CALC BMI ABV UP PARAM F/U: HCPCS | Performed by: STUDENT IN AN ORGANIZED HEALTH CARE EDUCATION/TRAINING PROGRAM

## 2022-05-18 PROCEDURE — 4040F PNEUMOC VAC/ADMIN/RCVD: CPT | Performed by: STUDENT IN AN ORGANIZED HEALTH CARE EDUCATION/TRAINING PROGRAM

## 2022-05-18 RX ORDER — ATORVASTATIN CALCIUM 80 MG/1
80 TABLET, FILM COATED ORAL DAILY
Qty: 90 TABLET | Refills: 1 | Status: SHIPPED | OUTPATIENT
Start: 2022-05-18 | End: 2022-09-07 | Stop reason: SDUPTHER

## 2022-05-18 SDOH — ECONOMIC STABILITY: FOOD INSECURITY: WITHIN THE PAST 12 MONTHS, THE FOOD YOU BOUGHT JUST DIDN'T LAST AND YOU DIDN'T HAVE MONEY TO GET MORE.: NEVER TRUE

## 2022-05-18 SDOH — ECONOMIC STABILITY: FOOD INSECURITY: WITHIN THE PAST 12 MONTHS, YOU WORRIED THAT YOUR FOOD WOULD RUN OUT BEFORE YOU GOT MONEY TO BUY MORE.: NEVER TRUE

## 2022-05-18 ASSESSMENT — ENCOUNTER SYMPTOMS
CHEST TIGHTNESS: 0
COUGH: 0
SORE THROAT: 0
ABDOMINAL PAIN: 0
SHORTNESS OF BREATH: 0
DIARRHEA: 0
WHEEZING: 0
CONSTIPATION: 0
NAUSEA: 0
VOMITING: 0

## 2022-05-18 ASSESSMENT — SOCIAL DETERMINANTS OF HEALTH (SDOH): HOW HARD IS IT FOR YOU TO PAY FOR THE VERY BASICS LIKE FOOD, HOUSING, MEDICAL CARE, AND HEATING?: SOMEWHAT HARD

## 2022-05-18 NOTE — PATIENT INSTRUCTIONS
Increase Ozempic to 1 mg once weekly starting 5/23/2022. Discontinue Metformin 5/23/2022 at the same time.

## 2022-05-18 NOTE — PROGRESS NOTES
Brigitte Mcleod (:  1940) is a [de-identified] y.o. male,Established patient, here for evaluation of the following chief complaint(s):  Diabetes (4 week follow up), Hypertension, and Hyperlipidemia         ASSESSMENT/PLAN:  1. Type 2 diabetes mellitus with hyperglycemia, without long-term current use of insulin (HCC)  -     Semaglutide,0.25 or 0.5MG/DOS, 2 MG/1.5ML SOPN; Inject 1 mg into the skin once a week, Disp-4 pen, R-2Normal  2. Mixed hyperlipidemia  -     atorvastatin (LIPITOR) 80 MG tablet; Take 1 tablet by mouth daily, Disp-90 tablet, R-1Print  3. Stage 3 chronic kidney disease, unspecified whether stage 3a or 3b CKD ([facility] [address]    Plan to increase Ozempic 0.5 mg to 1 mg once weekly starting 2022. Discontinue Metformin simultaneously starting 2022. Patient to have repeat BMP prior to next visit, has order for Nephrology follow up. Patient seen and plan discussed with Dr. Rakel Mendoza. Return in about 6 weeks (around 2022), or if symptoms worsen or fail to improve. Subjective   SUBJECTIVE/OBJECTIVE:  81 yo M with history of T2DM, HTN, HLD, CKD, here for 4-week routine follow up. Last visit was 2022 with myself and Dr. Rakel Mendoza. Reports no major changes or issues since last visit.     Diabetes Mellitus 2:   Patient forgot his meter today but reports having BG ranging 124-180 (only 1 episode in 180s, checked BG after eating). Avg BG 120s. No hypoglycemic episodes reported.  Taking Ozempic 0.5 mg weekly.      · HgbA1c 6.0 (2022)  · On Metformin 500 mg daily  · Last Eye exam 2022 at the [address] [place] [address] - early signs of cataracts / no retinopathy  · Last Foot exam 10/2021  · Seen by dietitian - on diabetic diet        Primary Hypertension:  · Controlled  · Metoprolol 25 mg BID and lisinopril 2.5 mg daily     CKD Stage 3:    · Cr elevated at 1.4 on 2021 (Prior 1.1 2021)   · Lisinopril 2.5 mg daily   · Followed by Dr. Shayla Aguillon     Chronic Systolic HF w/ reduced EF 45-50% (04/2008): ·       EF 45-50% on heart cath 04/2008  ·       On Lisinopril and metoprolol  ·       Daily weights     Hyperlipidemia:   · LDL 79 (Prior 116 08/2021)  · Lipitor 80 mg daily     Hypertriglyceridemia:  · Triglycerides 199 (Prior 275 08/2021)  · Fenofibrate 145 mg       Review of Systems   Constitutional: Negative. Negative for chills, diaphoresis, fatigue and fever. HENT: Negative for sore throat. Eyes: Negative for visual disturbance. Respiratory: Negative for cough, chest tightness, shortness of breath and wheezing. Cardiovascular: Negative for chest pain, palpitations and leg swelling. Gastrointestinal: Negative for abdominal pain, constipation, diarrhea, nausea and vomiting. Endocrine: Negative for polyuria. Genitourinary: Negative for dysuria. Musculoskeletal: Negative for joint swelling. Skin: Negative for rash and wound. Neurological: Negative for dizziness, syncope, weakness, light-headedness and numbness. All other systems reviewed and are negative. Objective    /60 (Site: Left Upper Arm)   Pulse 70   Temp 97.2 °F (36.2 °C)   Ht 5' 10.98\" (1.803 m)   Wt 186 lb 6.4 oz (84.6 kg)   BMI 26.01 kg/m²     Physical Exam  Vitals reviewed. Constitutional:       Appearance: Normal appearance. He is normal weight. HENT:      Head: Normocephalic and atraumatic. Nose: Nose normal.      Mouth/Throat:      Mouth: Mucous membranes are moist.   Eyes:      Extraocular Movements: Extraocular movements intact. Conjunctiva/sclera: Conjunctivae normal.      Pupils: Pupils are equal, round, and reactive to light. Cardiovascular:      Rate and Rhythm: Normal rate and regular rhythm. Pulses: Normal pulses. Heart sounds: Normal heart sounds. No murmur heard. No gallop. Pulmonary:      Effort: Pulmonary effort is normal.      Breath sounds: Normal breath sounds. Abdominal:      General: There is no distension. Palpations: Abdomen is soft. Tenderness: There is no abdominal tenderness. Musculoskeletal:         General: No swelling. Normal range of motion. Cervical back: Normal range of motion and neck supple. Skin:     General: Skin is warm and dry. Neurological:      General: No focal deficit present. Mental Status: He is alert and oriented to person, place, and time. Mental status is at baseline. Psychiatric:         Mood and Affect: Mood normal.            On this date 5/18/2022 I have spent 35 minutes reviewing previous notes, test results and face to face with the patient discussing the diagnosis and importance of compliance with the treatment plan as well as documenting on the day of the visit. An electronic signature was used to authenticate this note. --San Hodgkins, MD   . An[facility]  INTERNAL MEDICINE  [address]  Dept: [phone]  Dept Fax: [phone] : [phone]

## 2022-05-23 ENCOUNTER — NURSE ONLY (OUTPATIENT)
Dept: LAB | Age: 82
End: 2022-05-23

## 2022-05-23 DIAGNOSIS — N18.31 STAGE 3A CHRONIC KIDNEY DISEASE (HCC): ICD-10-CM

## 2022-05-23 LAB
ANION GAP SERPL CALCULATED.3IONS-SCNC: 7 MEQ/L (ref 8–16)
BUN BLDV-MCNC: 36 MG/DL (ref 7–22)
CALCIUM SERPL-MCNC: 9.7 MG/DL (ref 8.5–10.5)
CHLORIDE BLD-SCNC: 103 MEQ/L (ref 98–111)
CO2: 29 MEQ/L (ref 23–33)
CREAT SERPL-MCNC: 1.5 MG/DL (ref 0.4–1.2)
CREATININE, URINE: 119.3 MG/DL
GFR SERPL CREATININE-BSD FRML MDRD: 45 ML/MIN/1.73M2
GLUCOSE BLD-MCNC: 135 MG/DL (ref 70–108)
MICROALBUMIN UR-MCNC: < 1.2 MG/DL
MICROALBUMIN/CREAT UR-RTO: 10 MG/G (ref 0–30)
PHOSPHORUS: 2.7 MG/DL (ref 2.4–4.7)
POTASSIUM SERPL-SCNC: 4.3 MEQ/L (ref 3.5–5.2)
PTH INTACT: 28.1 PG/ML (ref 15–65)
SODIUM BLD-SCNC: 139 MEQ/L (ref 135–145)
VITAMIN D 25-HYDROXY: 66 NG/ML (ref 30–100)

## 2022-05-31 ENCOUNTER — OFFICE VISIT (OUTPATIENT)
Dept: NEPHROLOGY | Age: 82
End: 2022-05-31
Payer: MEDICARE

## 2022-05-31 VITALS
WEIGHT: 181 LBS | OXYGEN SATURATION: 98 % | HEART RATE: 77 BPM | DIASTOLIC BLOOD PRESSURE: 71 MMHG | BODY MASS INDEX: 25.26 KG/M2 | SYSTOLIC BLOOD PRESSURE: 133 MMHG

## 2022-05-31 DIAGNOSIS — N18.31 STAGE 3A CHRONIC KIDNEY DISEASE (HCC): Primary | ICD-10-CM

## 2022-05-31 PROCEDURE — 1036F TOBACCO NON-USER: CPT | Performed by: INTERNAL MEDICINE

## 2022-05-31 PROCEDURE — 99214 OFFICE O/P EST MOD 30 MIN: CPT | Performed by: INTERNAL MEDICINE

## 2022-05-31 PROCEDURE — G8427 DOCREV CUR MEDS BY ELIG CLIN: HCPCS | Performed by: INTERNAL MEDICINE

## 2022-05-31 PROCEDURE — 1123F ACP DISCUSS/DSCN MKR DOCD: CPT | Performed by: INTERNAL MEDICINE

## 2022-05-31 PROCEDURE — G8417 CALC BMI ABV UP PARAM F/U: HCPCS | Performed by: INTERNAL MEDICINE

## 2022-05-31 NOTE — PROGRESS NOTES
[address] [facility]  [address]  [facility] [address]  Dept: [phone]  Loc: [phone]  Progress Note  5/31/2022 10:04 AM      Pt Name:    Maame Brock  YOB: 1940  Primary Care Physician:  Jessy Vega MD     Chief Complaint:   Chief Complaint   Patient presents with    Follow-up     CKD III        History of Present Illness: This is a follow-up visit for CKD III. He has hx of DM, HTN, hyperlipidemia, CAD, renal cysts. Overall doing well. Sugars are improving. He feels ok. BP controlled. No edema. Denies NSAID use. Not drinking much water.         Pertinent items are noted in HPI. Past History:  Past Medical History:   Diagnosis Date    CAD (coronary artery disease)     % MANAGED MEDICALLY    Hyperlipidemia     Hypertension     Left ventricular dysfunction     MILD    Type II or unspecified type diabetes mellitus without mention of complication, not stated as uncontrolled      Past Surgical History:   Procedure Laterality Date    ADENOIDECTOMY  at the age of 13 yrs     TONSILLECTOMY  at the age of 13 yrs old        VITALS:  /71 (Site: Left Upper Arm, Position: Sitting, Cuff Size: Large Adult)   Pulse 77   Wt 181 lb (82.1 kg)   SpO2 98%   BMI 25.26 kg/m²   Wt Readings from Last 3 Encounters:   05/31/22 181 lb (82.1 kg)   05/18/22 186 lb 6.4 oz (84.6 kg)   04/13/22 184 lb (83.5 kg)     Body mass index is 25.26 kg/m².      General Appearance: alert and cooperative with exam, appears comfortable, no distress  HEENT: EOMI, moist oral mucus membranes  Neck: No jugular venous distention  Lungs: Air entry B/L, no crackles or rales, no use of accessory muscles  Heart: S1, S2 heard, no rub  GI: soft, non-tender, no guarding,  Extremities: No sig LE edema, no cyanosis  Skin: warm, dry  Neurologic: no tremor, no asterixis     Medications:  Current Outpatient Medications Medication Sig Dispense Refill    atorvastatin (LIPITOR) 80 MG tablet Take 1 tablet by mouth daily 90 tablet 1    Semaglutide,0.25 or 0.5MG/DOS, 2 MG/1.5ML SOPN Inject 1 mg into the skin once a week 4 pen 2    lisinopril (PRINIVIL;ZESTRIL) 2.5 MG tablet Take 1 tablet by mouth daily 90 tablet 1    metoprolol tartrate (LOPRESSOR) 50 MG tablet Take 0.5 tablets by mouth 2 times daily 90 tablet 1    fenofibrate (TRICOR) 145 MG tablet Take 1 tablet by mouth daily 90 tablet 1    Cyanocobalamin (VITAMIN B 12 PO) Take 1,000 mg by mouth daily      vitamin D (CHOLECALCIFEROL) 25 MCG (1000 UT) TABS tablet Take 2,000 Units by mouth daily       blood glucose monitor kit and supplies Check twice daily and as needed for symptoms of low/high blood glucose. Dispense sufficient amount for indicated testing frequency plus additional to accommodate PRN testing needs. Dispense all needed supplies to include: monitor, strips, lancing device, lancets, control solutions, alcohol swabs. 1 kit 0    Glucose Blood (BLOOD GLUCOSE TEST STRIPS) STRP Test Blood Sugars once daily. Accu-Check test strips. Dx E11.9 100 strip 3    nitroGLYCERIN (NITROSTAT) 0.4 MG SL tablet Place 1 tablet under the tongue every 5 minutes as needed. 25 tablet 5    Multiple Vitamin (MULTI-VITAMIN PO) Take  by mouth.  aspirin 81 MG tablet Take 81 mg by mouth daily. No current facility-administered medications for this visit.         Laboratory & Diagnostics:  CBC:   Lab Results   Component Value Date    WBC 6.2 04/09/2018    HGB 12.6 (L) 04/09/2018    HCT 36.8 (L) 04/09/2018    MCV 91 04/09/2018     04/09/2018     BMP:    Lab Results   Component Value Date     05/23/2022     11/05/2021     06/29/2021    K 4.3 05/23/2022    K 4.5 11/05/2021    K 4.4 06/29/2021     05/23/2022     11/05/2021     06/29/2021    CO2 29 05/23/2022    CO2 27 11/05/2021    CO2 29 06/29/2021    BUN 36 (H) 05/23/2022    BUN 39 (H) 11/05/2021    BUN 27 (H) 06/29/2021    CREATININE 1.5 (H) 05/23/2022    CREATININE 1.4 (H) 11/05/2021    CREATININE 1.1 06/29/2021    GLUCOSE 135 (H) 05/23/2022    GLUCOSE 141 (H) 11/05/2021    GLUCOSE 166 (H) 06/29/2021      Hepatic:   Lab Results   Component Value Date    AST 42 (H) 04/09/2018     (H) 12/20/2011    ALT 10 06/14/2018    ALT 9 (L) 04/09/2018    ALT 7 (L) 12/12/2017    BILITOT 0.6 04/09/2018    BILITOT 0.6 12/20/2011    ALKPHOS 79 04/09/2018    ALKPHOS 80 12/20/2011     BNP:   Lab Results   Component Value Date    BNP 74.8 03/18/2012     Lipids:   Lab Results   Component Value Date    CHOL 156 09/23/2021    HDL 37 09/23/2021     INR:   Lab Results   Component Value Date    INR 0.89 04/09/2018     URINE:   Lab Results   Component Value Date    NAUR 45 12/19/2011    PROTUR Negative 07/17/2019     Lab Results   Component Value Date    NITRU Negative 07/17/2019    COLORU Yellow 07/17/2019    COLORU RED 04/09/2018    PHUR 6.5 04/09/2018    WBCUA  04/09/2018    RBCUA > 200 04/09/2018    YEAST NONE SEEN 04/09/2018    BACTERIA MANY 04/09/2018    SPECGRAV 1.025 04/09/2018    LEUKOCYTESUR MODERATE 04/09/2018    LEUKOCYTESUR Trace 04/09/2018    UROBILINOGEN 0.20 07/17/2019    BILIRUBINUR Negative 07/17/2019    BLOODU Negative 07/17/2019    BLOODU LARGE 04/09/2018    GLUCOSEU Negative 07/17/2019    KETUA Negative 07/17/2019      Microalbumen/Creatinine ratio:  No components found for: RUCREAT        Impression/Plan:   1. CKD III in setting of DM and HTN: creatinine worse. Not drinking much water. Increase water intake. Repeat in 6 months along with SPEP. If continues to worsen will repeat a renal US . Goals of care include slowing rate of progression by controlling blood pressure, blood glucoses and albuminuria and by avoiding nephrotoxins such as NSAIDs and IV contrast.    2. DM, improving. No proteinuria  3. HTN  4.  Right renal cyst    Bloodwork and medications were reviewed and plan of care discussed with the patient. Return to clinic in 6 months  or sooner if the need arises.       Reed Thomas,   [facility]

## 2022-07-05 ENCOUNTER — NURSE ONLY (OUTPATIENT)
Dept: LAB | Age: 82
End: 2022-07-05

## 2022-07-05 DIAGNOSIS — N18.31 STAGE 3A CHRONIC KIDNEY DISEASE (HCC): ICD-10-CM

## 2022-07-05 LAB
ANION GAP SERPL CALCULATED.3IONS-SCNC: 8 MEQ/L (ref 8–16)
BACTERIA: NORMAL
BILIRUBIN URINE: NEGATIVE
BLOOD, URINE: NEGATIVE
BUN BLDV-MCNC: 32 MG/DL (ref 7–22)
CALCIUM SERPL-MCNC: 10.7 MG/DL (ref 8.5–10.5)
CASTS: NORMAL /LPF
CASTS: NORMAL /LPF
CHARACTER, URINE: CLEAR
CHLORIDE BLD-SCNC: 102 MEQ/L (ref 98–111)
CO2: 29 MEQ/L (ref 23–33)
COLOR: YELLOW
CREAT SERPL-MCNC: 1.5 MG/DL (ref 0.4–1.2)
CRYSTALS: NORMAL
EPITHELIAL CELLS, UA: NORMAL /HPF
GFR SERPL CREATININE-BSD FRML MDRD: 45 ML/MIN/1.73M2
GLUCOSE BLD-MCNC: 128 MG/DL (ref 70–108)
GLUCOSE, URINE: NEGATIVE MG/DL
KETONES, URINE: NEGATIVE
LEUKOCYTE ESTERASE, URINE: NEGATIVE
MISCELLANEOUS LAB TEST RESULT: NORMAL
NITRITE, URINE: NEGATIVE
PH UA: 7 (ref 5–9)
POTASSIUM SERPL-SCNC: 4 MEQ/L (ref 3.5–5.2)
PROTEIN UA: NEGATIVE MG/DL
RBC URINE: NORMAL /HPF
RENAL EPITHELIAL, UA: NORMAL
SODIUM BLD-SCNC: 139 MEQ/L (ref 135–145)
SPECIFIC GRAVITY UA: 1.02 (ref 1–1.03)
UROBILINOGEN, URINE: 1 EU/DL (ref 0–1)
WBC UA: NORMAL /HPF
YEAST: NORMAL

## 2022-07-06 ENCOUNTER — OFFICE VISIT (OUTPATIENT)
Dept: INTERNAL MEDICINE CLINIC | Age: 82
End: 2022-07-06
Payer: MEDICARE

## 2022-07-06 VITALS
SYSTOLIC BLOOD PRESSURE: 122 MMHG | BODY MASS INDEX: 25.02 KG/M2 | DIASTOLIC BLOOD PRESSURE: 60 MMHG | TEMPERATURE: 98.2 F | WEIGHT: 179.3 LBS | HEART RATE: 76 BPM

## 2022-07-06 DIAGNOSIS — E78.00 PURE HYPERCHOLESTEROLEMIA: Primary | ICD-10-CM

## 2022-07-06 DIAGNOSIS — E11.65 TYPE 2 DIABETES MELLITUS WITH HYPERGLYCEMIA, WITHOUT LONG-TERM CURRENT USE OF INSULIN (HCC): ICD-10-CM

## 2022-07-06 PROCEDURE — G8427 DOCREV CUR MEDS BY ELIG CLIN: HCPCS | Performed by: STUDENT IN AN ORGANIZED HEALTH CARE EDUCATION/TRAINING PROGRAM

## 2022-07-06 PROCEDURE — 99213 OFFICE O/P EST LOW 20 MIN: CPT | Performed by: STUDENT IN AN ORGANIZED HEALTH CARE EDUCATION/TRAINING PROGRAM

## 2022-07-06 PROCEDURE — G8417 CALC BMI ABV UP PARAM F/U: HCPCS | Performed by: STUDENT IN AN ORGANIZED HEALTH CARE EDUCATION/TRAINING PROGRAM

## 2022-07-06 PROCEDURE — 1123F ACP DISCUSS/DSCN MKR DOCD: CPT | Performed by: STUDENT IN AN ORGANIZED HEALTH CARE EDUCATION/TRAINING PROGRAM

## 2022-07-06 PROCEDURE — 3044F HG A1C LEVEL LT 7.0%: CPT | Performed by: STUDENT IN AN ORGANIZED HEALTH CARE EDUCATION/TRAINING PROGRAM

## 2022-07-06 PROCEDURE — 1036F TOBACCO NON-USER: CPT | Performed by: STUDENT IN AN ORGANIZED HEALTH CARE EDUCATION/TRAINING PROGRAM

## 2022-07-06 ASSESSMENT — ENCOUNTER SYMPTOMS
DIARRHEA: 0
NAUSEA: 0
CONSTIPATION: 0
BACK PAIN: 0
WHEEZING: 0
SHORTNESS OF BREATH: 0
RHINORRHEA: 0
COUGH: 0
ABDOMINAL PAIN: 0

## 2022-07-06 NOTE — PROGRESS NOTES
Ludmila Gallagher (:  1940) is a [de-identified] y.o. male,Established patient, here for evaluation of the following chief complaint(s):  Diabetes, Chronic Kidney Disease, and Hyperlipidemia         ASSESSMENT/PLAN:  1. Pure hypercholesterolemia  -     Lipid Panel; Future  2. Type 2 diabetes mellitus with hyperglycemia, without long-term current use of insulin (HCC)  -     Semaglutide,0.25 or 0.5MG/DOS, 2 MG/1.5ML SOPN; Inject 1 mg into the skin once a week, Disp-4 pen, R-2Print  -     Hemoglobin A1C; Future      Return in about 9 weeks (around 2022). Plan:  -Continue current regimen and treatment plan  -Hemoglobin A1c  -Lipid panel      Subjective   SUBJECTIVE/OBJECTIVE:  HPI    Mr. Kashmir Delaney is a [de-identified] yo male here for routine follow-up. No acute complaints today. Reports feeling well.      Diabetes Mellitus 2:   · HgbA1c 6.0 / No hypoglycemic episode / Report BS ranging 120 - 180. · On Ozempic 1 mg weekly  · MACR 10  · Last Eye exam 2022 at the [address] [place] [address] of cataracts / no retinopathy  · Last Foot exam 10/2021  · Seen by dietitian - on diabetic diet     Primary Hypertension:  · Controlled  · Metoprolol 25 mg BID and lisinopril 2.5 mg daily      CKD Stage 3:    · Cr stable at 1.5   · Lisinopril 2.5 mg daily   · Followed by Dr. Jordyn Sheltno     Chronic Systolic HF w/ reduced EF 45-50% (2008): ·       EF 45-50% on heart cath 2008  ·       On Lisinopril and metoprolol  ·       Daily weights     Hyperlipidemia:   · LDL 79 (Prior 116 2021)  · Lipitor 80 mg daily     Hypertriglyceridemia:  · Triglycerides 199 (Prior 275 2021)  · Fenofibrate 145 mg initiated        Review of Systems   Constitutional: Negative for chills, diaphoresis and fatigue. HENT: Negative for congestion, hearing loss, postnasal drip and rhinorrhea. Eyes: Negative for visual disturbance. Respiratory: Negative for cough, shortness of breath and wheezing.     Cardiovascular: Negative for chest pain, palpitations and leg swelling. Gastrointestinal: Negative for abdominal pain, constipation, diarrhea and nausea. Endocrine: Negative for polyphagia and polyuria. Genitourinary: Negative for difficulty urinating, dysuria, flank pain and hematuria. Musculoskeletal: Negative for back pain and myalgias. Skin: Negative for pallor and rash. Neurological: Negative for facial asymmetry, light-headedness, numbness and headaches. Hematological: Negative for adenopathy. Psychiatric/Behavioral: Negative for behavioral problems, confusion, decreased concentration and dysphoric mood. Objective   Physical Exam  Constitutional:       General: He is not in acute distress. Appearance: He is not ill-appearing or toxic-appearing. HENT:      Head: Normocephalic and atraumatic. Right Ear: External ear normal.      Left Ear: External ear normal.   Eyes:      General: No scleral icterus. Right eye: No discharge. Left eye: No discharge. Cardiovascular:      Rate and Rhythm: Normal rate and regular rhythm. Pulses: Normal pulses. Heart sounds: No murmur heard. No friction rub. No gallop. Pulmonary:      Effort: Pulmonary effort is normal. No respiratory distress. Breath sounds: Normal breath sounds. No wheezing or rales. Abdominal:      General: Bowel sounds are normal. There is no distension. Palpations: Abdomen is soft. Tenderness: There is no abdominal tenderness. There is no guarding. Musculoskeletal:         General: Normal range of motion. Cervical back: Normal range of motion and neck supple. Right lower leg: No edema. Left lower leg: No edema. Skin:     General: Skin is warm and dry. Capillary Refill: Capillary refill takes less than 2 seconds. Neurological:      General: No focal deficit present. Mental Status: He is alert and oriented to person, place, and time.    Psychiatric:         Behavior: Behavior normal.         Thought Content: Thought content normal.            On this date 7/6/2022 I have spent 20 minutes reviewing previous notes, test results and face to face with the patient discussing the diagnosis and importance of compliance with the treatment plan as well as documenting on the day of the visit. An electronic signature was used to authenticate this note. --MD Ce Dwyer. An[facility] Mitchell  INTERNAL MEDICINE  [address]  Dept: [phone]  Dept Fax: [phone] : [phone]

## 2022-07-07 LAB — KAPPA/LAMBDA FREE LIGHT CHAINS: NORMAL

## 2022-07-09 LAB — IMMUNOFIXATION ELECTROPHORESIS: NORMAL

## 2022-08-29 ENCOUNTER — TELEPHONE (OUTPATIENT)
Dept: NEPHROLOGY | Age: 82
End: 2022-08-29

## 2022-08-29 DIAGNOSIS — N18.31 CHRONIC KIDNEY DISEASE (CKD) STAGE G3A/A1, MODERATELY DECREASED GLOMERULAR FILTRATION RATE (GFR) BETWEEN 45-59 ML/MIN/1.73 SQUARE METER AND ALBUMINURIA CREATININE RATIO LESS THAN 30 MG/G (HCC): Primary | ICD-10-CM

## 2022-08-29 NOTE — TELEPHONE ENCOUNTER
Pt called and states he accidentally got his blood work for December in July. What blood work would you like him to do in December?

## 2022-08-30 ENCOUNTER — NURSE ONLY (OUTPATIENT)
Dept: LAB | Age: 82
End: 2022-08-30

## 2022-08-30 DIAGNOSIS — E11.65 TYPE 2 DIABETES MELLITUS WITH HYPERGLYCEMIA, WITHOUT LONG-TERM CURRENT USE OF INSULIN (HCC): ICD-10-CM

## 2022-08-30 DIAGNOSIS — E78.00 PURE HYPERCHOLESTEROLEMIA: ICD-10-CM

## 2022-08-30 LAB
AVERAGE GLUCOSE: 138 MG/DL (ref 70–126)
CHOLESTEROL, TOTAL: 178 MG/DL (ref 100–199)
HBA1C MFR BLD: 6.6 % (ref 4.4–6.4)
HDLC SERPL-MCNC: 45 MG/DL
LDL CHOLESTEROL CALCULATED: 91 MG/DL
TRIGL SERPL-MCNC: 209 MG/DL (ref 0–199)

## 2022-09-07 ENCOUNTER — OFFICE VISIT (OUTPATIENT)
Dept: INTERNAL MEDICINE CLINIC | Age: 82
End: 2022-09-07
Payer: MEDICARE

## 2022-09-07 VITALS
HEART RATE: 86 BPM | WEIGHT: 181.2 LBS | DIASTOLIC BLOOD PRESSURE: 64 MMHG | TEMPERATURE: 98.2 F | BODY MASS INDEX: 25.28 KG/M2 | SYSTOLIC BLOOD PRESSURE: 130 MMHG

## 2022-09-07 DIAGNOSIS — I10 ESSENTIAL HYPERTENSION: ICD-10-CM

## 2022-09-07 DIAGNOSIS — E78.2 MIXED HYPERLIPIDEMIA: ICD-10-CM

## 2022-09-07 DIAGNOSIS — E11.65 TYPE 2 DIABETES MELLITUS WITH HYPERGLYCEMIA, WITHOUT LONG-TERM CURRENT USE OF INSULIN (HCC): Primary | ICD-10-CM

## 2022-09-07 PROCEDURE — 3044F HG A1C LEVEL LT 7.0%: CPT | Performed by: STUDENT IN AN ORGANIZED HEALTH CARE EDUCATION/TRAINING PROGRAM

## 2022-09-07 PROCEDURE — 99213 OFFICE O/P EST LOW 20 MIN: CPT | Performed by: STUDENT IN AN ORGANIZED HEALTH CARE EDUCATION/TRAINING PROGRAM

## 2022-09-07 PROCEDURE — 1036F TOBACCO NON-USER: CPT | Performed by: STUDENT IN AN ORGANIZED HEALTH CARE EDUCATION/TRAINING PROGRAM

## 2022-09-07 PROCEDURE — G8427 DOCREV CUR MEDS BY ELIG CLIN: HCPCS | Performed by: STUDENT IN AN ORGANIZED HEALTH CARE EDUCATION/TRAINING PROGRAM

## 2022-09-07 PROCEDURE — 1123F ACP DISCUSS/DSCN MKR DOCD: CPT | Performed by: STUDENT IN AN ORGANIZED HEALTH CARE EDUCATION/TRAINING PROGRAM

## 2022-09-07 PROCEDURE — G8417 CALC BMI ABV UP PARAM F/U: HCPCS | Performed by: STUDENT IN AN ORGANIZED HEALTH CARE EDUCATION/TRAINING PROGRAM

## 2022-09-07 RX ORDER — METOPROLOL TARTRATE 50 MG/1
25 TABLET, FILM COATED ORAL 2 TIMES DAILY
Qty: 90 TABLET | Refills: 1 | Status: SHIPPED | OUTPATIENT
Start: 2022-09-07

## 2022-09-07 RX ORDER — LISINOPRIL 2.5 MG/1
2.5 TABLET ORAL DAILY
Qty: 90 TABLET | Refills: 1 | Status: SHIPPED | OUTPATIENT
Start: 2022-09-07

## 2022-09-07 RX ORDER — ATORVASTATIN CALCIUM 80 MG/1
80 TABLET, FILM COATED ORAL DAILY
Qty: 90 TABLET | Refills: 1 | Status: SHIPPED | OUTPATIENT
Start: 2022-09-07

## 2022-09-07 RX ORDER — FENOFIBRATE 145 MG/1
145 TABLET, COATED ORAL DAILY
Qty: 90 TABLET | Refills: 1 | Status: SHIPPED | OUTPATIENT
Start: 2022-09-07

## 2022-09-07 ASSESSMENT — ENCOUNTER SYMPTOMS
COLOR CHANGE: 0
ABDOMINAL PAIN: 0
WHEEZING: 0
SINUS PRESSURE: 0
RHINORRHEA: 0
PHOTOPHOBIA: 0
CONSTIPATION: 0
BACK PAIN: 0
COUGH: 0
SHORTNESS OF BREATH: 0
VOMITING: 0
DIARRHEA: 0

## 2022-09-07 NOTE — PROGRESS NOTES
Annemarie Azul (:  1940) is a [de-identified] y.o. male,Established patient, here for evaluation of the following chief complaint(s):  Hyperlipidemia and Diabetes         ASSESSMENT/PLAN:  1. Type 2 diabetes mellitus with hyperglycemia, without long-term current use of insulin (HCC)  -     Hemoglobin A1C; Future  -     Microalbumin / Creatinine Urine Ratio; Future  2. Essential hypertension  -     lisinopril (PRINIVIL;ZESTRIL) 2.5 MG tablet; Take 1 tablet by mouth daily, Disp-90 tablet, R-1Print  -     metoprolol tartrate (LOPRESSOR) 50 MG tablet; Take 0.5 tablets by mouth 2 times daily, Disp-90 tablet, R-1Print  3. Mixed hyperlipidemia  -     fenofibrate (TRICOR) 145 MG tablet; Take 1 tablet by mouth daily, Disp-90 tablet, R-1Print  -     atorvastatin (LIPITOR) 80 MG tablet; Take 1 tablet by mouth daily, Disp-90 tablet, R-1Print    Return in about 12 weeks (around 2022). Plan:  Counseled patient on diet, exercise, and lifestyle modifications  Hemoglobin A1c and MACR prior to next office visit  Follow-up in 3 months       Subjective   SUBJECTIVE/OBJECTIVE:  HPI    Mr. Miriam Wilder is a [de-identified] yo male here for routine follow-up. No acute complaints today. Reports feeling well.       Diabetes Mellitus 2:   HgbA1c 6.0 / No hypoglycemic episode / Has not been checking BS recently  On Ozempic 1 mg weekly  MACR 10  Last Eye exam 2022 at the [place] - early signs of cataracts / no retinopathy  Last Foot exam 10/2021  Seen by dietitian - on diabetic diet     Primary Hypertension:  Controlled  Metoprolol 25 mg BID and lisinopril 2.5 mg daily      CKD Stage 3:    Cr stable at 1.5   Lisinopril 2.5 mg daily   Followed by Dr. Juana Lemus Systolic HF w/ reduced EF 45-50% (2008):        EF 45-50% on heart cath 2008        On Lisinopril and metoprolol        Daily weights     Hyperlipidemia:   LDL 91 (Prior 79 2021)  Lipitor 80 mg daily     Hypertriglyceridemia:  Triglycerides 209 (Prior 199 09/2021)  Fenofibrate 145 mg initiated    Review of Systems   Constitutional:  Negative for chills, diaphoresis and fatigue. HENT:  Negative for congestion, postnasal drip, rhinorrhea and sinus pressure. Eyes:  Negative for photophobia and visual disturbance. Respiratory:  Negative for cough, shortness of breath and wheezing. Cardiovascular:  Negative for chest pain, palpitations and leg swelling. Gastrointestinal:  Negative for abdominal pain, constipation, diarrhea and vomiting. Genitourinary:  Negative for dysuria, frequency, hematuria and urgency. Musculoskeletal:  Negative for back pain and myalgias. Skin:  Negative for color change and pallor. Neurological:  Negative for dizziness, light-headedness, numbness and headaches. Hematological:  Negative for adenopathy. Psychiatric/Behavioral:  Negative for behavioral problems, confusion, decreased concentration and dysphoric mood. Objective   Physical Exam  Constitutional:       General: He is not in acute distress. Appearance: Normal appearance. He is not toxic-appearing. HENT:      Head: Normocephalic and atraumatic. Right Ear: External ear normal.      Left Ear: External ear normal.   Eyes:      General: No scleral icterus. Right eye: No discharge. Left eye: No discharge. Extraocular Movements: Extraocular movements intact. Conjunctiva/sclera: Conjunctivae normal.      Pupils: Pupils are equal, round, and reactive to light. Cardiovascular:      Rate and Rhythm: Normal rate and regular rhythm. Pulses: Normal pulses. Heart sounds: Normal heart sounds. No murmur heard. No friction rub. No gallop. Pulmonary:      Effort: Pulmonary effort is normal. No respiratory distress. Breath sounds: Normal breath sounds. No wheezing or rales. Abdominal:      General: Abdomen is flat. There is no distension. Palpations: Abdomen is soft. Tenderness:  There is no abdominal tenderness. Musculoskeletal:         General: Normal range of motion. Cervical back: Normal range of motion and neck supple. Right lower leg: No edema. Left lower leg: No edema. Skin:     General: Skin is warm and dry. Capillary Refill: Capillary refill takes less than 2 seconds. Neurological:      General: No focal deficit present. Mental Status: He is alert and oriented to person, place, and time. Psychiatric:         Behavior: Behavior normal.         Thought Content: Thought content normal.          On this date 9/7/2022 I have spent 20 minutes reviewing previous notes, test results and face to face with the patient discussing the diagnosis and importance of compliance with the treatment plan as well as documenting on the day of the visit. An electronic signature was used to authenticate this note. --Twila Posadas MD   Internal Medicine Resident  [facility]  [address]  Dept: [phone]  Dept Fax: [phone] : [phone]

## 2022-11-28 ENCOUNTER — NURSE ONLY (OUTPATIENT)
Dept: LAB | Age: 82
End: 2022-11-28

## 2022-11-28 DIAGNOSIS — N18.31 CHRONIC KIDNEY DISEASE (CKD) STAGE G3A/A1, MODERATELY DECREASED GLOMERULAR FILTRATION RATE (GFR) BETWEEN 45-59 ML/MIN/1.73 SQUARE METER AND ALBUMINURIA CREATININE RATIO LESS THAN 30 MG/G (HCC): ICD-10-CM

## 2022-11-28 DIAGNOSIS — E11.65 TYPE 2 DIABETES MELLITUS WITH HYPERGLYCEMIA, WITHOUT LONG-TERM CURRENT USE OF INSULIN (HCC): ICD-10-CM

## 2022-11-28 LAB
ANION GAP SERPL CALCULATED.3IONS-SCNC: 8 MEQ/L (ref 8–16)
AVERAGE GLUCOSE: 138 MG/DL (ref 70–126)
BUN BLDV-MCNC: 40 MG/DL (ref 7–22)
CALCIUM SERPL-MCNC: 10.2 MG/DL (ref 8.5–10.5)
CHLORIDE BLD-SCNC: 103 MEQ/L (ref 98–111)
CO2: 28 MEQ/L (ref 23–33)
CREAT SERPL-MCNC: 1.6 MG/DL (ref 0.4–1.2)
CREATININE URINE: 199.2 MG/DL
CREATININE, URINE: 203.9 MG/DL
ERYTHROCYTE [DISTWIDTH] IN BLOOD BY AUTOMATED COUNT: 13.2 % (ref 11.5–14.5)
ERYTHROCYTE [DISTWIDTH] IN BLOOD BY AUTOMATED COUNT: 47.3 FL (ref 35–45)
GFR SERPL CREATININE-BSD FRML MDRD: 43 ML/MIN/1.73M2
GLUCOSE BLD-MCNC: 146 MG/DL (ref 70–108)
HBA1C MFR BLD: 6.6 % (ref 4.4–6.4)
HCT VFR BLD CALC: 40.4 % (ref 42–52)
HEMOGLOBIN: 13.1 GM/DL (ref 14–18)
MCH RBC QN AUTO: 31.6 PG (ref 26–33)
MCHC RBC AUTO-ENTMCNC: 32.4 GM/DL (ref 32.2–35.5)
MCV RBC AUTO: 97.6 FL (ref 80–94)
MICROALBUMIN UR-MCNC: < 1.2 MG/DL
MICROALBUMIN/CREAT UR-RTO: 6 MG/G (ref 0–30)
PHOSPHORUS: 2.9 MG/DL (ref 2.4–4.7)
PLATELET # BLD: 289 THOU/MM3 (ref 130–400)
PMV BLD AUTO: 9.3 FL (ref 9.4–12.4)
POTASSIUM SERPL-SCNC: 4.7 MEQ/L (ref 3.5–5.2)
PROT/CREAT RATIO, UR: 0.08
PROTEIN, URINE: 15.6 MG/DL
PTH INTACT: 40.7 PG/ML (ref 15–65)
RBC # BLD: 4.14 MILL/MM3 (ref 4.7–6.1)
SODIUM BLD-SCNC: 139 MEQ/L (ref 135–145)
WBC # BLD: 5 THOU/MM3 (ref 4.8–10.8)

## 2022-11-30 ENCOUNTER — OFFICE VISIT (OUTPATIENT)
Dept: INTERNAL MEDICINE CLINIC | Age: 82
End: 2022-11-30

## 2022-11-30 VITALS
TEMPERATURE: 97 F | WEIGHT: 177.4 LBS | SYSTOLIC BLOOD PRESSURE: 106 MMHG | HEIGHT: 71 IN | BODY MASS INDEX: 24.84 KG/M2 | HEART RATE: 66 BPM | DIASTOLIC BLOOD PRESSURE: 60 MMHG

## 2022-11-30 DIAGNOSIS — I10 ESSENTIAL HYPERTENSION: ICD-10-CM

## 2022-11-30 DIAGNOSIS — E11.65 TYPE 2 DIABETES MELLITUS WITH HYPERGLYCEMIA, WITHOUT LONG-TERM CURRENT USE OF INSULIN (HCC): ICD-10-CM

## 2022-11-30 DIAGNOSIS — E78.2 MIXED HYPERLIPIDEMIA: ICD-10-CM

## 2022-11-30 RX ORDER — LISINOPRIL 2.5 MG/1
2.5 TABLET ORAL DAILY
Qty: 90 TABLET | Refills: 5 | Status: SHIPPED | OUTPATIENT
Start: 2022-11-30

## 2022-11-30 RX ORDER — METOPROLOL TARTRATE 50 MG/1
25 TABLET, FILM COATED ORAL 2 TIMES DAILY
Qty: 90 TABLET | Refills: 5 | Status: SHIPPED | OUTPATIENT
Start: 2022-11-30

## 2022-11-30 RX ORDER — FENOFIBRATE 145 MG/1
145 TABLET, COATED ORAL DAILY
Qty: 90 TABLET | Refills: 5 | Status: SHIPPED | OUTPATIENT
Start: 2022-11-30

## 2022-11-30 RX ORDER — ATORVASTATIN CALCIUM 80 MG/1
80 TABLET, FILM COATED ORAL DAILY
Qty: 90 TABLET | Refills: 5 | Status: SHIPPED | OUTPATIENT
Start: 2022-11-30

## 2022-11-30 ASSESSMENT — ENCOUNTER SYMPTOMS
FACIAL SWELLING: 0
PHOTOPHOBIA: 0
CONSTIPATION: 0
ABDOMINAL PAIN: 0
SHORTNESS OF BREATH: 0
WHEEZING: 0
NAUSEA: 0
BACK PAIN: 0
DIARRHEA: 0
COUGH: 0

## 2022-11-30 NOTE — PROGRESS NOTES
Metro [place] (:  1940) is a [de-identified] y.o. male,Established patient, here for evaluation of the following chief complaint(s):  Diabetes (Has some nausea after takes his injection), Hypertension, and Hyperlipidemia         ASSESSMENT/PLAN:  1. Mixed hyperlipidemia  -     atorvastatin (LIPITOR) 80 MG tablet; Take 1 tablet by mouth daily, Disp-90 tablet, R-5Print  -     fenofibrate (TRICOR) 145 MG tablet; Take 1 tablet by mouth daily, Disp-90 tablet, R-5Print  2. Essential hypertension  -     lisinopril (PRINIVIL;ZESTRIL) 2.5 MG tablet; Take 1 tablet by mouth daily, Disp-90 tablet, R-5Print  -     metoprolol tartrate (LOPRESSOR) 50 MG tablet; Take 0.5 tablets by mouth 2 times daily, Disp-90 tablet, R-5Print  3. Type 2 diabetes mellitus with hyperglycemia, without long-term current use of insulin (HCC)  -     Semaglutide,0.25 or 0.5MG/DOS, 2 MG/1.5ML SOPN; Inject 1 mg into the skin once a week, Disp-4 Adjustable Dose Pre-filled Pen Syringe, R-5Print    Return in about 14 weeks (around 3/8/2023). Plan:  1) Type 2 DM without insulin - Continues to do well on Ozempic. A1c 6.6. C/w current dose of 1 mg / week. 2) Essential Hypertension - Remains well controlled. C/w current regimen  3) Mixed Hyperlipidemia - Controlled. C/w Lipitor and Fenofibrate      Subjective   SUBJECTIVE/OBJECTIVE:  HPI    Mr. Lennie Vergara is a [de-identified] yo male here for routine follow-up. No acute complaints today. Reports feeling well. Diabetes Mellitus 2:   Controlled. HgbA1c 6.6 / No hypoglycemic episode / Average BS of 139  On Ozempic 1 mg weekly / Tolerating well.    MACR  6 mg/g  Next eye exam 2022 at the [facility] (Prior 2022 - early signs of cataracts / no retinopathy)  Last Foot exam 10/2022 at the [facility]  Seen by dietitian - on diabetic diet     Primary Hypertension:  Controlled  Metoprolol 25 mg BID and lisinopril 2.5 mg daily      CKD Stage 3:    Cr stable at 1.6   Lisinopril 2.5 mg daily   Followed by Dr. 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 HENT:  Negative for congestion, facial swelling, postnasal drip and sneezing. Eyes:  Negative for photophobia and visual disturbance. Respiratory:  Negative for cough, shortness of breath and wheezing. Cardiovascular:  Negative for chest pain, palpitations and leg swelling. Gastrointestinal:  Negative for abdominal pain, constipation, diarrhea and nausea. Endocrine: Negative for polydipsia and polyuria. Genitourinary:  Negative for dysuria, frequency, hematuria and urgency. Musculoskeletal:  Negative for back pain and myalgias. Skin:  Negative for pallor and rash. Neurological:  Negative for facial asymmetry, light-headedness, numbness and headaches. Psychiatric/Behavioral:  Negative for behavioral problems, confusion, decreased concentration and dysphoric mood. Objective   Physical Exam  Constitutional:       General: He is not in acute distress. Appearance: He is not ill-appearing or toxic-appearing. HENT:      Head: Normocephalic and atraumatic. Right Ear: External ear normal.      Left Ear: External ear normal.   Eyes:      General: No scleral icterus. Right eye: No discharge. Left eye: No discharge. Extraocular Movements: Extraocular movements intact. Conjunctiva/sclera: Conjunctivae normal.      Pupils: Pupils are equal, round, and reactive to light. Cardiovascular:      Rate and Rhythm: Normal rate and regular rhythm. Pulses: Normal pulses. Heart sounds: Normal heart sounds. No murmur heard. No friction rub. No gallop.    Pulmonary:      Effort: Pulmonary effort is normal. No respiratory distress. Breath sounds: Normal breath sounds. No wheezing or rales. Abdominal:      General: Abdomen is flat. Bowel sounds are normal. There is no distension. Palpations: Abdomen is soft. Tenderness: There is no abdominal tenderness. Musculoskeletal:         General: Normal range of motion. Cervical back: Normal range of motion and neck supple. Right lower leg: No edema. Left lower leg: No edema. Skin:     General: Skin is warm and dry. Capillary Refill: Capillary refill takes less than 2 seconds. Neurological:      General: No focal deficit present. Mental Status: He is alert and oriented to person, place, and time. Psychiatric:         Behavior: Behavior normal.         Thought Content: Thought content normal.          On this date 11/30/2022 I have spent 25 minutes reviewing previous notes, test results and face to face with the patient discussing the diagnosis and importance of compliance with the treatment plan as well as documenting on the day of the visit. An electronic signature was used to authenticate this note. --Roopa Aragon MD   Internal Medicine Resident, PGY-3  . [facility]  [address]  Dept: [phone]  Dept Fax: [phone] : [phone]

## 2022-12-06 ENCOUNTER — OFFICE VISIT (OUTPATIENT)
Dept: NEPHROLOGY | Age: 82
End: 2022-12-06
Payer: MEDICARE

## 2022-12-06 VITALS
DIASTOLIC BLOOD PRESSURE: 72 MMHG | HEART RATE: 88 BPM | BODY MASS INDEX: 24.7 KG/M2 | SYSTOLIC BLOOD PRESSURE: 124 MMHG | WEIGHT: 177 LBS | OXYGEN SATURATION: 98 %

## 2022-12-06 DIAGNOSIS — N18.32 STAGE 3B CHRONIC KIDNEY DISEASE (HCC): Primary | ICD-10-CM

## 2022-12-06 DIAGNOSIS — N28.1 RENAL CYSTS, ACQUIRED, BILATERAL: ICD-10-CM

## 2022-12-06 PROCEDURE — 99213 OFFICE O/P EST LOW 20 MIN: CPT | Performed by: INTERNAL MEDICINE

## 2022-12-06 PROCEDURE — 3078F DIAST BP <80 MM HG: CPT | Performed by: INTERNAL MEDICINE

## 2022-12-06 PROCEDURE — G8427 DOCREV CUR MEDS BY ELIG CLIN: HCPCS | Performed by: INTERNAL MEDICINE

## 2022-12-06 PROCEDURE — 1123F ACP DISCUSS/DSCN MKR DOCD: CPT | Performed by: INTERNAL MEDICINE

## 2022-12-06 PROCEDURE — G8484 FLU IMMUNIZE NO ADMIN: HCPCS | Performed by: INTERNAL MEDICINE

## 2022-12-06 PROCEDURE — 1036F TOBACCO NON-USER: CPT | Performed by: INTERNAL MEDICINE

## 2022-12-06 PROCEDURE — 3074F SYST BP LT 130 MM HG: CPT | Performed by: INTERNAL MEDICINE

## 2022-12-06 PROCEDURE — G8420 CALC BMI NORM PARAMETERS: HCPCS | Performed by: INTERNAL MEDICINE

## 2022-12-06 NOTE — PROGRESS NOTES
[address] [facility]  [address]  [facility] [address]  Dept: [phone]  Loc: [phone]  Progress Note  12/6/2022 2:10 PM      Pt Name:    Roe Spencer:    1940  Primary Care Physician:  Vivian Mcdonough MD     Chief Complaint:   Chief Complaint   Patient presents with    Follow-up     CKD III        History of Present Illness: This is a follow-up visit for CKD III. He has hx of DM, HTN, hyperlipidemia, CAD, renal cysts. Doing ok. No complaints. Bps controlled. Sugars controlled. Will be seeing podiatrist tomorrow for some right foot pain. Pertinent items are noted in HPI. Past History:  Past Medical History:   Diagnosis Date    CAD (coronary artery disease)     % MANAGED MEDICALLY    Hyperlipidemia     Hypertension     Left ventricular dysfunction     MILD    Type II or unspecified type diabetes mellitus without mention of complication, not stated as uncontrolled      Past Surgical History:   Procedure Laterality Date    ADENOIDECTOMY  at the age of 13 yrs     TONSILLECTOMY  at the age of 13 yrs old        VITALS:  /72 (Site: Left Upper Arm, Position: Sitting, Cuff Size: Large Adult)   Pulse 88   Wt 177 lb (80.3 kg)   SpO2 98%   BMI 24.70 kg/m²   Wt Readings from Last 3 Encounters:   12/06/22 177 lb (80.3 kg)   11/30/22 177 lb 6.4 oz (80.5 kg)   09/07/22 181 lb 3.2 oz (82.2 kg)     Body mass index is 24.7 kg/m².      General Appearance: alert and cooperative with exam, appears comfortable, no distress  HEENT: EOMI, moist oral mucus membranes  Neck: No jugular venous distention  Lungs: Air entry B/L, no crackles or rales, no use of accessory muscles  Heart: S1, S2 heard, no rub  GI: soft, non-tender, no guarding,  Extremities: No sig LE edema, no cyanosis  Skin: warm, dry  Neurologic: no tremor, no asterixis     Medications:  Current Outpatient Medications   Medication Sig Dispense Refill    atorvastatin (LIPITOR) 80 MG tablet Take 1 tablet by mouth daily 90 tablet 5    fenofibrate (TRICOR) 145 MG tablet Take 1 tablet by mouth daily 90 tablet 5    lisinopril (PRINIVIL;ZESTRIL) 2.5 MG tablet Take 1 tablet by mouth daily 90 tablet 5    metoprolol tartrate (LOPRESSOR) 50 MG tablet Take 0.5 tablets by mouth 2 times daily 90 tablet 5    Semaglutide,0.25 or 0.5MG/DOS, 2 MG/1.5ML SOPN Inject 1 mg into the skin once a week 4 Adjustable Dose Pre-filled Pen Syringe 5    Cyanocobalamin (VITAMIN B 12 PO) Take 1,000 mg by mouth daily      blood glucose monitor kit and supplies Check twice daily and as needed for symptoms of low/high blood glucose. Dispense sufficient amount for indicated testing frequency plus additional to accommodate PRN testing needs. Dispense all needed supplies to include: monitor, strips, lancing device, lancets, control solutions, alcohol swabs. 1 kit 0    Glucose Blood (BLOOD GLUCOSE TEST STRIPS) STRP Test Blood Sugars once daily. Accu-Check test strips. Dx E11.9 100 strip 3    nitroGLYCERIN (NITROSTAT) 0.4 MG SL tablet Place 1 tablet under the tongue every 5 minutes as needed. 25 tablet 5    Multiple Vitamin (MULTI-VITAMIN PO) Take  by mouth. aspirin 81 MG tablet Take 81 mg by mouth daily. No current facility-administered medications for this visit.         Laboratory & Diagnostics:  CBC:   Lab Results   Component Value Date    WBC 5.0 11/28/2022    HGB 13.1 (L) 11/28/2022    HCT 40.4 (L) 11/28/2022    MCV 97.6 (H) 11/28/2022     11/28/2022     BMP:    Lab Results   Component Value Date     11/28/2022     07/05/2022     05/23/2022    K 4.7 11/28/2022    K 4.0 07/05/2022    K 4.3 05/23/2022     11/28/2022     07/05/2022     05/23/2022    CO2 28 11/28/2022    CO2 29 07/05/2022    CO2 29 05/23/2022    BUN 40 (H) 11/28/2022    BUN 32 (H) 07/05/2022    BUN 36 (H) 05/23/2022    CREATININE 1.6 (H) 11/28/2022    CREATININE 1.5 (H) 07/05/2022    CREATININE 1.5 (H) 05/23/2022    GLUCOSE 146 (H) 11/28/2022    GLUCOSE 128 (H) 07/05/2022    GLUCOSE 135 (H) 05/23/2022      Hepatic:   Lab Results   Component Value Date    AST 42 (H) 04/09/2018     (H) 12/20/2011    ALT 10 06/14/2018    ALT 9 (L) 04/09/2018    ALT 7 (L) 12/12/2017    BILITOT 0.6 04/09/2018    BILITOT 0.6 12/20/2011    ALKPHOS 79 04/09/2018    ALKPHOS 80 12/20/2011     BNP:   Lab Results   Component Value Date    BNP 74.8 03/18/2012     Lipids:   Lab Results   Component Value Date    CHOL 178 08/30/2022    HDL 45 08/30/2022     INR:   Lab Results   Component Value Date    INR 0.89 04/09/2018     URINE:   Lab Results   Component Value Date/Time    NAUR 45 12/19/2011 06:45 PM    PROTUR 15.6 11/28/2022 11:03 AM     Lab Results   Component Value Date/Time    NITRU NEGATIVE 07/05/2022 11:52 AM    COLORU YELLOW 07/05/2022 11:52 AM    PHUR 7.0 07/05/2022 11:52 AM    LABCAST NONE SEEN 07/05/2022 11:52 AM    LABCAST NONE SEEN 07/05/2022 11:52 AM    WBCUA 0-2 07/05/2022 11:52 AM    RBCUA NONE SEEN 07/05/2022 11:52 AM    YEAST NONE SEEN 07/05/2022 11:52 AM    BACTERIA NONE SEEN 07/05/2022 11:52 AM    SPECGRAV 1.018 07/05/2022 11:52 AM    LEUKOCYTESUR NEGATIVE 07/05/2022 11:52 AM    UROBILINOGEN 1.0 07/05/2022 11:52 AM    BILIRUBINUR NEGATIVE 07/05/2022 11:52 AM    BLOODU NEGATIVE 07/05/2022 11:52 AM    GLUCOSEU Negative 07/17/2019 02:34 PM    KETUA NEGATIVE 07/05/2022 11:52 AM      Microalbumen/Creatinine ratio:  No components found for: RUCREAT        Impression/Plan:   1. CKD III in setting of DM and HTN: renal fxn has worsened over the past year. No significant proteinuria. BP controlled as well as sugars. Meds look ok. Goals of care include slowing rate of progression by controlling blood pressure, blood glucoses and albuminuria and by avoiding nephrotoxins such as NSAIDs and IV contrast.    2. DM, stable  3. HTN  4.  Right renal cyst, repeat US    Bloodwork and medications were reviewed and plan of care discussed with the patient. Return to clinic in 6 months  or sooner if the need arises.       Estefania Beach DO  [facility]

## 2022-12-08 ENCOUNTER — HOSPITAL ENCOUNTER (OUTPATIENT)
Dept: ULTRASOUND IMAGING | Age: 82
Discharge: HOME | End: 2022-12-08
Payer: MEDICARE

## 2022-12-08 DIAGNOSIS — N28.1 RENAL CYSTS, ACQUIRED, BILATERAL: ICD-10-CM

## 2022-12-08 PROCEDURE — 76770 US EXAM ABDO BACK WALL COMP: CPT

## 2023-03-08 ENCOUNTER — OFFICE VISIT (OUTPATIENT)
Dept: INTERNAL MEDICINE CLINIC | Age: 83
End: 2023-03-08

## 2023-03-08 DIAGNOSIS — E11.65 TYPE 2 DIABETES MELLITUS WITH HYPERGLYCEMIA, WITHOUT LONG-TERM CURRENT USE OF INSULIN (HCC): Primary | ICD-10-CM

## 2023-03-08 LAB — HBA1C MFR BLD: 6.4 % (ref 4.3–5.7)

## 2023-03-08 SDOH — ECONOMIC STABILITY: INCOME INSECURITY: HOW HARD IS IT FOR YOU TO PAY FOR THE VERY BASICS LIKE FOOD, HOUSING, MEDICAL CARE, AND HEATING?: NOT HARD AT ALL

## 2023-03-08 SDOH — ECONOMIC STABILITY: HOUSING INSECURITY
IN THE LAST 12 MONTHS, WAS THERE A TIME WHEN YOU DID NOT HAVE A STEADY PLACE TO SLEEP OR SLEPT IN A SHELTER (INCLUDING NOW)?: NO

## 2023-03-08 SDOH — ECONOMIC STABILITY: FOOD INSECURITY: WITHIN THE PAST 12 MONTHS, YOU WORRIED THAT YOUR FOOD WOULD RUN OUT BEFORE YOU GOT MONEY TO BUY MORE.: NEVER TRUE

## 2023-03-08 ASSESSMENT — PATIENT HEALTH QUESTIONNAIRE - PHQ9
SUM OF ALL RESPONSES TO PHQ QUESTIONS 1-9: 0
2. FEELING DOWN, DEPRESSED OR HOPELESS: 0
SUM OF ALL RESPONSES TO PHQ9 QUESTIONS 1 & 2: 0
1. LITTLE INTEREST OR PLEASURE IN DOING THINGS: 0

## 2023-03-08 ASSESSMENT — ENCOUNTER SYMPTOMS
SHORTNESS OF BREATH: 0
ABDOMINAL PAIN: 0
DIARRHEA: 0
WHEEZING: 0
RHINORRHEA: 0
SORE THROAT: 0
NAUSEA: 0
EYE REDNESS: 0
BACK PAIN: 0
COUGH: 0
VOMITING: 0

## 2023-03-08 NOTE — PROGRESS NOTES
Aretha San (:  1940) is a [de-identified] y.o. male,Established patient, here for evaluation of the following chief complaint(s):  Diabetes (14 weeks ), Hypertension, and Hyperlipidemia         ASSESSMENT/PLAN:  1. Type 2 diabetes mellitus with hyperglycemia, without long-term current use of insulin ([facility])  -     POCT glycosylated hemoglobin (Hb A1C)  -     14342 - Collection Capillary Blood Specimen    Return in about 13 weeks (around 2023). Assessment / Plan:  Type 2 DM - Continues to do well on Ozempic. A1c 6.4. C/w current dose of 1 mg / week. Essential Hypertension - Remains well controlled. C/w current regimen    Mixed Hyperlipidemia - Controlled. C/w Lipitor and Fenofibrate      Subjective   SUBJECTIVE/OBJECTIVE:  HPI    Mr. Christophe Childers is a [de-identified] yo male here for routine follow-up. No acute complaints today. Reports feeling well. Diabetes Mellitus 2:   Controlled  HgbA1c 6.4 / No hypoglycemic episode   On Ozempic 1 mg weekly / Tolerating well   MACR  6 mg/g  Last eye exam 2022 at the [facility] (early signs of cataracts / no retinopathy)  Last Foot exam 10/2022 at the [facility]  Seen by dietitian - on diabetic diet     Primary Hypertension:  Controlled  Metoprolol 25 mg BID and lisinopril 2.5 mg daily      CKD Stage 3:    Cr stable at 1.6   Lisinopril 2.5 mg daily   Followed by Dr. 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 HENT:  Negative for congestion, postnasal drip, rhinorrhea and sore throat. Eyes:  Negative for redness and visual disturbance. Respiratory:  Negative for cough, shortness of breath and wheezing.     Cardiovascular:  Negative for chest pain, palpitations and leg swelling. Gastrointestinal:  Negative for abdominal pain, diarrhea, nausea and vomiting. Endocrine: Negative for polydipsia and polyuria. Genitourinary:  Negative for dysuria, frequency, hematuria and urgency. Musculoskeletal:  Negative for back pain and myalgias. Skin:  Negative for pallor and rash. Neurological:  Negative for weakness, light-headedness, numbness and headaches. Psychiatric/Behavioral:  Negative for confusion, decreased concentration and dysphoric mood. The patient is not hyperactive. Objective   Physical Exam  Constitutional:       General: He is not in acute distress. Appearance: Normal appearance. He is not ill-appearing or toxic-appearing. HENT:      Head: Normocephalic and atraumatic. Right Ear: External ear normal.      Left Ear: External ear normal.   Eyes:      General: No scleral icterus. Right eye: No discharge. Left eye: No discharge. Extraocular Movements: Extraocular movements intact. Conjunctiva/sclera: Conjunctivae normal.      Pupils: Pupils are equal, round, and reactive to light. Cardiovascular:      Rate and Rhythm: Normal rate and regular rhythm. Pulses: Normal pulses. Heart sounds: Normal heart sounds. No murmur heard. No friction rub. No gallop. Pulmonary:      Effort: Pulmonary effort is normal. No respiratory distress. Breath sounds: Normal breath sounds. No wheezing or rales. Abdominal:      General: Abdomen is flat. Bowel sounds are normal. There is no distension. Palpations: Abdomen is soft. Tenderness: There is no abdominal tenderness. There is no guarding. Musculoskeletal:         General: Normal range of motion. Cervical back: Normal range of motion and neck supple. Right lower leg: No edema. Left lower leg: No edema. Skin:     General: Skin is warm and dry. Capillary Refill: Capillary refill takes less than 2 seconds. Neurological:      General: No focal deficit present. Mental Status: He is alert and oriented to person, place, and time. Psychiatric:         Behavior: Behavior normal.         Thought Content: Thought content normal.          On this date 3/8/2023 I have spent 15 minutes reviewing previous notes, test results and face to face with the patient discussing the diagnosis and importance of compliance with the treatment plan as well as documenting on the day of the visit. An electronic signature was used to authenticate this note. --Karon Andujar MD   Internal Medicine Resident, PGY-3  Attending: Dr. Alejandra Babin. Doreen Mehta.  [address]  Dept: [phone]  Dept Fax: [phone] : [phone]

## 2023-04-19 DIAGNOSIS — E11.65 TYPE 2 DIABETES MELLITUS WITH HYPERGLYCEMIA, WITHOUT LONG-TERM CURRENT USE OF INSULIN (HCC): ICD-10-CM

## 2023-04-19 NOTE — TELEPHONE ENCOUNTER
Patient is requesting refills for Ozempic. He only has two injections left and next appt isn't until June 14th.

## 2023-06-28 ENCOUNTER — NURSE ONLY (OUTPATIENT)
Dept: LAB | Age: 83
End: 2023-06-28

## 2023-06-28 DIAGNOSIS — N18.32 STAGE 3B CHRONIC KIDNEY DISEASE (HCC): ICD-10-CM

## 2023-06-28 LAB
ANION GAP SERPL CALC-SCNC: 11 MEQ/L (ref 8–16)
BUN SERPL-MCNC: 38 MG/DL (ref 7–22)
CALCIUM SERPL-MCNC: 9.6 MG/DL (ref 8.5–10.5)
CHLORIDE SERPL-SCNC: 104 MEQ/L (ref 98–111)
CO2 SERPL-SCNC: 26 MEQ/L (ref 23–33)
CREAT SERPL-MCNC: 1.4 MG/DL (ref 0.4–1.2)
GFR SERPL CREATININE-BSD FRML MDRD: 50 ML/MIN/1.73M2
GLUCOSE SERPL-MCNC: 207 MG/DL (ref 70–108)
POTASSIUM SERPL-SCNC: 4 MEQ/L (ref 3.5–5.2)
SODIUM SERPL-SCNC: 141 MEQ/L (ref 135–145)

## 2023-06-29 ENCOUNTER — NURSE ONLY (OUTPATIENT)
Dept: LAB | Age: 83
End: 2023-06-29

## 2023-06-29 DIAGNOSIS — E78.2 MIXED HYPERLIPIDEMIA: ICD-10-CM

## 2023-06-29 LAB
CHOLEST SERPL-MCNC: 125 MG/DL (ref 100–199)
HDLC SERPL-MCNC: 40 MG/DL
LDLC SERPL CALC-MCNC: 63 MG/DL
TRIGL SERPL-MCNC: 111 MG/DL (ref 0–199)

## 2023-07-13 ENCOUNTER — OFFICE VISIT (OUTPATIENT)
Dept: NEPHROLOGY | Age: 83
End: 2023-07-13
Payer: MEDICARE

## 2023-07-13 VITALS
WEIGHT: 168 LBS | SYSTOLIC BLOOD PRESSURE: 137 MMHG | OXYGEN SATURATION: 98 % | DIASTOLIC BLOOD PRESSURE: 63 MMHG | BODY MASS INDEX: 23.44 KG/M2 | HEART RATE: 58 BPM

## 2023-07-13 DIAGNOSIS — N18.32 STAGE 3B CHRONIC KIDNEY DISEASE (HCC): Primary | ICD-10-CM

## 2023-07-13 PROCEDURE — 99213 OFFICE O/P EST LOW 20 MIN: CPT | Performed by: INTERNAL MEDICINE

## 2023-07-13 PROCEDURE — 3075F SYST BP GE 130 - 139MM HG: CPT | Performed by: INTERNAL MEDICINE

## 2023-07-13 PROCEDURE — 3078F DIAST BP <80 MM HG: CPT | Performed by: INTERNAL MEDICINE

## 2023-07-13 PROCEDURE — 1123F ACP DISCUSS/DSCN MKR DOCD: CPT | Performed by: INTERNAL MEDICINE

## 2023-07-13 PROCEDURE — G8427 DOCREV CUR MEDS BY ELIG CLIN: HCPCS | Performed by: INTERNAL MEDICINE

## 2023-07-13 PROCEDURE — G8420 CALC BMI NORM PARAMETERS: HCPCS | Performed by: INTERNAL MEDICINE

## 2023-07-13 PROCEDURE — 1036F TOBACCO NON-USER: CPT | Performed by: INTERNAL MEDICINE

## 2023-09-19 ENCOUNTER — OFFICE VISIT (OUTPATIENT)
Dept: INTERNAL MEDICINE CLINIC | Age: 83
End: 2023-09-19
Payer: MEDICARE

## 2023-09-19 VITALS
BODY MASS INDEX: 23.27 KG/M2 | OXYGEN SATURATION: 98 % | HEART RATE: 64 BPM | HEIGHT: 71 IN | DIASTOLIC BLOOD PRESSURE: 66 MMHG | WEIGHT: 166.2 LBS | SYSTOLIC BLOOD PRESSURE: 110 MMHG

## 2023-09-19 DIAGNOSIS — E78.2 MIXED HYPERLIPIDEMIA: ICD-10-CM

## 2023-09-19 DIAGNOSIS — E11.9 TYPE 2 DIABETES MELLITUS WITHOUT COMPLICATION, WITHOUT LONG-TERM CURRENT USE OF INSULIN (HCC): Primary | ICD-10-CM

## 2023-09-19 LAB — HBA1C MFR BLD: 6.3 % (ref 4.3–5.7)

## 2023-09-19 PROCEDURE — 3044F HG A1C LEVEL LT 7.0%: CPT | Performed by: STUDENT IN AN ORGANIZED HEALTH CARE EDUCATION/TRAINING PROGRAM

## 2023-09-19 PROCEDURE — 83036 HEMOGLOBIN GLYCOSYLATED A1C: CPT | Performed by: STUDENT IN AN ORGANIZED HEALTH CARE EDUCATION/TRAINING PROGRAM

## 2023-09-19 PROCEDURE — 1123F ACP DISCUSS/DSCN MKR DOCD: CPT | Performed by: STUDENT IN AN ORGANIZED HEALTH CARE EDUCATION/TRAINING PROGRAM

## 2023-09-19 PROCEDURE — 3074F SYST BP LT 130 MM HG: CPT | Performed by: STUDENT IN AN ORGANIZED HEALTH CARE EDUCATION/TRAINING PROGRAM

## 2023-09-19 PROCEDURE — G8420 CALC BMI NORM PARAMETERS: HCPCS | Performed by: STUDENT IN AN ORGANIZED HEALTH CARE EDUCATION/TRAINING PROGRAM

## 2023-09-19 PROCEDURE — 1036F TOBACCO NON-USER: CPT | Performed by: STUDENT IN AN ORGANIZED HEALTH CARE EDUCATION/TRAINING PROGRAM

## 2023-09-19 PROCEDURE — 99213 OFFICE O/P EST LOW 20 MIN: CPT | Performed by: STUDENT IN AN ORGANIZED HEALTH CARE EDUCATION/TRAINING PROGRAM

## 2023-09-19 PROCEDURE — 3078F DIAST BP <80 MM HG: CPT | Performed by: STUDENT IN AN ORGANIZED HEALTH CARE EDUCATION/TRAINING PROGRAM

## 2023-09-19 PROCEDURE — G8427 DOCREV CUR MEDS BY ELIG CLIN: HCPCS | Performed by: STUDENT IN AN ORGANIZED HEALTH CARE EDUCATION/TRAINING PROGRAM

## 2023-09-19 NOTE — PROGRESS NOTES
Not on file     Lack of Transportation (Non-Medical): No   Physical Activity: Not on file   Stress: Not on file   Social Connections: Not on file   Intimate Partner Violence: Not on file   Housing Stability: Unknown (6/14/2023)    Housing Stability Vital Sign     Unable to Pay for Housing in the Last Year: Not on file     Number of Places Lived in the Last Year: Not on file     Unstable Housing in the Last Year: No       Family History   Problem Relation Age of Onset    Prostate Cancer Paternal Uncle        Review of Systems - General ROS: negative for - chills or fever  Psychological ROS: negative for - anxiety and depression  Hematological and Lymphatic ROS: No history of blood clots or bleeding disorder. Respiratory ROS: no cough, shortness of breath, or wheezing  Cardiovascular ROS: no chest pain or dyspnea on exertion, tolerates a flight of stairs, vacuuming  Gastrointestinal ROS: no abdominal pain, change in bowel habits, or black or bloody stools  Genito-Urinary ROS: no dysuria, trouble voiding, or hematuria  Musculoskeletal ROS: negative for - muscle pain or muscular weakness  Neurological ROS: negative for - headaches, numbness/tingling, seizures or weakness  Dermatological ROS: negative for - rash or skin lesion changes    Blood pressure 110/66, pulse 64, height 5' 10.98\" (1.803 m), weight 166 lb 3.2 oz (75.4 kg), SpO2 98 %.     Physical Examination: General appearance -alert, well appearing, and in no distress  Mental status - alert, oriented to person, place, and time  Head - atraumatic, normocephalic  Eyes - pupils equal and reactive to light, extraocular muscles intact  Ears - external ears are normal, hearing is grossly normal  Mouth - oral pharynx clear, mucous membranes moist  Neck - supple, no significant adenopathy  Chest - clear to auscultation, no wheezes, rales or rhonchi, symmetric air entry  Heart - normal rate, regular rhythm, normal S1, S2, no murmurs, rubs, clicks or gallops  Abdomen

## 2023-10-06 ENCOUNTER — APPOINTMENT (OUTPATIENT)
Dept: CT IMAGING | Age: 83
End: 2023-10-06
Payer: MEDICARE

## 2023-10-06 ENCOUNTER — APPOINTMENT (OUTPATIENT)
Dept: GENERAL RADIOLOGY | Age: 83
End: 2023-10-06
Payer: MEDICARE

## 2023-10-06 ENCOUNTER — HOSPITAL ENCOUNTER (EMERGENCY)
Age: 83
Discharge: HOME | End: 2023-10-06
Attending: FAMILY MEDICINE
Payer: MEDICARE

## 2023-10-06 VITALS
RESPIRATION RATE: 18 BRPM | SYSTOLIC BLOOD PRESSURE: 144 MMHG | OXYGEN SATURATION: 100 % | HEART RATE: 66 BPM | WEIGHT: 166 LBS | BODY MASS INDEX: 23.16 KG/M2 | DIASTOLIC BLOOD PRESSURE: 65 MMHG | TEMPERATURE: 97.7 F

## 2023-10-06 DIAGNOSIS — S09.90XA CLOSED HEAD INJURY, INITIAL ENCOUNTER: ICD-10-CM
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
## 2023-10-06 DIAGNOSIS — S43.014A ANTERIOR SHOULDER DISLOCATION, RIGHT, INITIAL ENCOUNTER: ICD-10-CM

## 2023-10-06 DIAGNOSIS — S01.81XA FACIAL LACERATION, INITIAL ENCOUNTER: Primary | ICD-10-CM

## 2023-10-06 DIAGNOSIS — I10 ESSENTIAL HYPERTENSION: ICD-10-CM

## 2023-10-06 LAB
ALBUMIN SERPL BCG-MCNC: 4 G/DL (ref 3.5–5.1)
ALP SERPL-CCNC: 45 U/L (ref 38–126)
ALT SERPL W/O P-5'-P-CCNC: 8 U/L (ref 11–66)
ANION GAP SERPL CALC-SCNC: 10 MEQ/L (ref 8–16)
AST SERPL-CCNC: 31 U/L (ref 5–40)
BASOPHILS ABSOLUTE: 0 THOU/MM3 (ref 0–0.1)
BASOPHILS NFR BLD AUTO: 0.8 %
BILIRUB SERPL-MCNC: 0.4 MG/DL (ref 0.3–1.2)
BUN SERPL-MCNC: 29 MG/DL (ref 7–22)
CALCIUM SERPL-MCNC: 9.4 MG/DL (ref 8.5–10.5)
CHLORIDE SERPL-SCNC: 103 MEQ/L (ref 98–111)
CO2 SERPL-SCNC: 26 MEQ/L (ref 23–33)
CREAT SERPL-MCNC: 1.5 MG/DL (ref 0.4–1.2)
DEPRECATED RDW RBC AUTO: 45.8 FL (ref 35–45)
EOSINOPHIL NFR BLD AUTO: 2.3 %
EOSINOPHILS ABSOLUTE: 0.1 THOU/MM3 (ref 0–0.4)
ERYTHROCYTE [DISTWIDTH] IN BLOOD BY AUTOMATED COUNT: 13.3 % (ref 11.5–14.5)
GFR SERPL CREATININE-BSD FRML MDRD: 46 ML/MIN/1.73M2
GLUCOSE SERPL-MCNC: 134 MG/DL (ref 70–108)
HCT VFR BLD AUTO: 36.3 % (ref 42–52)
HGB BLD-MCNC: 11.8 GM/DL (ref 14–18)
IMM GRANULOCYTES # BLD AUTO: 0.01 THOU/MM3 (ref 0–0.07)
IMM GRANULOCYTES NFR BLD AUTO: 0.3 %
LYMPHOCYTES ABSOLUTE: 0.7 THOU/MM3 (ref 1–4.8)
LYMPHOCYTES NFR BLD AUTO: 17.7 %
MCH RBC QN AUTO: 30.7 PG (ref 26–33)
MCHC RBC AUTO-ENTMCNC: 32.5 GM/DL (ref 32.2–35.5)
MCV RBC AUTO: 94.5 FL (ref 80–94)
MONOCYTES ABSOLUTE: 0.5 THOU/MM3 (ref 0.4–1.3)
MONOCYTES NFR BLD AUTO: 12.3 %
NEUTROPHILS NFR BLD AUTO: 66.6 %
NRBC BLD AUTO-RTO: 0 /100 WBC
OSMOLALITY SERPL CALC.SUM OF ELEC: 285.3 MOSMOL/KG (ref 275–300)
PLATELET # BLD AUTO: 200 THOU/MM3 (ref 130–400)
PMV BLD AUTO: 9.1 FL (ref 9.4–12.4)
POTASSIUM SERPL-SCNC: 4.1 MEQ/L (ref 3.5–5.2)
PROT SERPL-MCNC: 6.2 G/DL (ref 6.1–8)
RBC # BLD AUTO: 3.84 MILL/MM3 (ref 4.7–6.1)
SEGMENTED NEUTROPHILS ABSOLUTE COUNT: 2.6 THOU/MM3 (ref 1.8–7.7)
SODIUM SERPL-SCNC: 139 MEQ/L (ref 135–145)
WBC # BLD AUTO: 3.9 THOU/MM3 (ref 4.8–10.8)

## 2023-10-06 PROCEDURE — 85025 COMPLETE CBC W/AUTO DIFF WBC: CPT

## 2023-10-06 PROCEDURE — 23650 CLTX SHO DSLC W/MNPJ WO ANES: CPT

## 2023-10-06 PROCEDURE — 2500000003 HC RX 250 WO HCPCS: Performed by: EMERGENCY MEDICINE

## 2023-10-06 PROCEDURE — 71045 X-RAY EXAM CHEST 1 VIEW: CPT

## 2023-10-06 PROCEDURE — 36415 COLL VENOUS BLD VENIPUNCTURE: CPT

## 2023-10-06 PROCEDURE — 12013 RPR F/E/E/N/L/M 2.6-5.0 CM: CPT

## 2023-10-06 PROCEDURE — 70450 CT HEAD/BRAIN W/O DYE: CPT

## 2023-10-06 PROCEDURE — 72125 CT NECK SPINE W/O DYE: CPT

## 2023-10-06 PROCEDURE — 73030 X-RAY EXAM OF SHOULDER: CPT

## 2023-10-06 PROCEDURE — 93005 ELECTROCARDIOGRAM TRACING: CPT | Performed by: FAMILY MEDICINE

## 2023-10-06 PROCEDURE — 99285 EMERGENCY DEPT VISIT HI MDM: CPT

## 2023-10-06 PROCEDURE — 80053 COMPREHEN METABOLIC PANEL: CPT

## 2023-10-06 PROCEDURE — 6370000000 HC RX 637 (ALT 250 FOR IP): Performed by: EMERGENCY MEDICINE

## 2023-10-06 PROCEDURE — 93010 ELECTROCARDIOGRAM REPORT: CPT | Performed by: INTERNAL MEDICINE

## 2023-10-06 RX ORDER — GINSENG 100 MG
CAPSULE ORAL 3 TIMES DAILY
Status: DISCONTINUED | OUTPATIENT
Start: 2023-10-06 | End: 2023-10-06 | Stop reason: HOSPADM

## 2023-10-06 RX ORDER — ACETAMINOPHEN 500 MG
1000 TABLET ORAL ONCE
Status: COMPLETED | OUTPATIENT
Start: 2023-10-06 | End: 2023-10-06

## 2023-10-06 RX ORDER — LIDOCAINE HYDROCHLORIDE 10 MG/ML
30 INJECTION, SOLUTION EPIDURAL; INFILTRATION; INTRACAUDAL; PERINEURAL ONCE
Status: COMPLETED | OUTPATIENT
Start: 2023-10-06 | End: 2023-10-06

## 2023-10-06 RX ADMIN — ACETAMINOPHEN 1000 MG: 500 TABLET ORAL at 19:31

## 2023-10-06 RX ADMIN — LIDOCAINE HYDROCHLORIDE 30 ML: 10 INJECTION, SOLUTION EPIDURAL; INFILTRATION; INTRACAUDAL; PERINEURAL at 19:43

## 2023-10-06 ASSESSMENT — PAIN - FUNCTIONAL ASSESSMENT: PAIN_FUNCTIONAL_ASSESSMENT: 0-10

## 2023-10-06 ASSESSMENT — PAIN SCALES - GENERAL
PAINLEVEL_OUTOF10: 3
PAINLEVEL_OUTOF10: 9

## 2023-10-06 NOTE — ED NOTES
Shoulder manipulation started by Dr Saman Escobar and Dr Kera Richardson, [address] [place] [address]  10/06/23 1943

## 2023-10-06 NOTE — ED TRIAGE NOTES
[place] with c/o fall. Pt states he was reaching over to grab his ball and tumbled over, arrives with obvious deformity to right shoulder  Also has large lac to right forehead.    Aox4, VSS

## 2023-10-07 LAB
EKG ATRIAL RATE: 56 BPM
EKG P AXIS: 32 DEGREES
EKG P-R INTERVAL: 198 MS
EKG Q-T INTERVAL: 420 MS
EKG QRS DURATION: 90 MS
EKG QTC CALCULATION (BAZETT): 405 MS
EKG R AXIS: 43 DEGREES
EKG T AXIS: 22 DEGREES
EKG VENTRICULAR RATE: 56 BPM

## 2023-10-07 NOTE — DISCHARGE INSTRUCTIONS
You were seen at [facility] emergency department after a fall. On initial exam, you had a laceration on your forehead and a dislocated shoulder. CT head, cervical spine, and chest did not have any new injuries. You have 6 blue prolene sutures over your eyebrow. Please be sure to keep this wound clean and dry until sutures are removed. Some of your skin was torn off around your laceration, and it will scab over with time. Do not hesitate to return to the ER if you notice signs of infection (redness, warmth, pus) or continued bleeding     Please do not go bowling until you are assessed at [facility].

## 2023-10-07 NOTE — ED NOTES
Patient resting in bed. Respirations easy and unlabored. No distress noted. Call light within reach.        Ghada Raza RN  10/06/23 2448

## 2023-10-07 NOTE — ED PROVIDER NOTES
[facility] ENCOUNTER          Pt Name: Fronie Severe  MRN: [id number]  [address] [facility] [place] 1940  Date of evaluation: 10/6/2023  ED Resident: Castro Carrington MD  ED Attending: Dr. Ean Chapa       Chief Complaint   Patient presents with    Fall    Laceration     History obtained from the patient. HISTORY OF PRESENT ILLNESS    HPI  Fronie Severe is a 82 y.o. male who presents to the emergency department for evaluation of fall. Patient states that he was at a family gathering, was reaching down for a golf ball and lost his balance. He fell forward and hit his head on the ground, as well as fell onto his outstretched arm. Obvious deformity noted on arrival, deltoid is flat, patient has significant tenderness to palpation around the glenohumeral joint and is unable to move his arm well without pain. Denies loss of consciousness, does have a bandage on his head from the laceration. Denies chest pain, shortness of breath, dizziness. The patient has no other acute complaints at this time.     PAST MEDICAL AND SURGICAL HISTORY     Past Medical History:   Diagnosis Date    CAD (coronary artery disease)     % MANAGED MEDICALLY    Hyperlipidemia     Hypertension     Left ventricular dysfunction     MILD    Type II or unspecified type diabetes mellitus without mention of complication, not stated as uncontrolled      Past Surgical History:   Procedure Laterality Date    ADENOIDECTOMY  at the age of 13 yrs     TONSILLECTOMY  at the age of 13 yrs old       MEDICATIONS     Current Facility-Administered Medications:     bacitracin ointment, , Topical, TID, Castro Carrington MD    Current Outpatient Medications:     Semaglutide,0.25 or 0.5MG/DOS, 2 MG/1.5ML SOPN, Inject 1 mg into the skin once a week, Disp: 4 Adjustable Dose Pre-filled Pen Syringe, Rfl: 5    atorvastatin (LIPITOR) 80 MG tablet, Take 1 tablet by mouth daily, Disp: 90 Signs:  Vitals:    10/06/23 2109   BP:    Pulse: 66   Resp: 18   Temp:    SpO2:      Physical Exam  Constitutional:       General: He is not in acute distress. Appearance: He is not ill-appearing. HENT:      Head: Normocephalic. Abrasion (skin tear surrounding lac.) and laceration present. No Luo's sign. Nose: Nose normal.   Eyes:      Extraocular Movements: Extraocular movements intact. Pupils: Pupils are equal, round, and reactive to light. Cardiovascular:      Pulses: Normal pulses. Comments: Radial 2+ bilaterally  Pulmonary:      Effort: Pulmonary effort is normal.      Breath sounds: Normal breath sounds. Abdominal:      General: There is no distension. Palpations: Abdomen is soft. Tenderness: There is no abdominal tenderness. Musculoskeletal:         General: Tenderness and deformity (stepoff over r shoulder with flattened deltoid. Pt holding arm adducted, flexed at the elbow against chest. Secured with dressing by EMS. ) present. Cervical back: Normal range of motion. No rigidity or tenderness. Skin:     Capillary Refill: Capillary refill takes less than 2 seconds. Neurological:      General: No focal deficit present. Mental Status: He is alert and oriented to person, place, and time. DIFFERENTIALS   Initial Assessment: Given the patient's above chief complaint and findings on history and physical examination, I thought it was appropriate to consider the following emergency medical conditions:    SDH, ICH, cervical vertebral injury, shoulder dislocation +/- humerus fracture     Plan: CTH, CT c-spine, XR R shoulder, Lac repair     Although some of these diagnoses are unlikely they were considered in my medical decision making.   Chronic Conditions considered:   Patient Active Problem List   Diagnosis    CAD (coronary artery disease)    Hyperlipidemia    Type 2 diabetes mellitus without complication ([address])    Hypertension    Left ventricular

## 2023-10-09 RX ORDER — LISINOPRIL 2.5 MG/1
2.5 TABLET ORAL DAILY
Qty: 90 TABLET | Refills: 5 | Status: SHIPPED | OUTPATIENT
Start: 2023-10-09

## 2023-10-17 DIAGNOSIS — E11.65 TYPE 2 DIABETES MELLITUS WITH HYPERGLYCEMIA, WITHOUT LONG-TERM CURRENT USE OF INSULIN (HCC): ICD-10-CM

## 2023-10-17 DIAGNOSIS — I10 ESSENTIAL HYPERTENSION: ICD-10-CM

## 2023-10-17 DIAGNOSIS — E78.2 MIXED HYPERLIPIDEMIA: ICD-10-CM

## 2023-10-17 RX ORDER — METOPROLOL TARTRATE 50 MG/1
25 TABLET, FILM COATED ORAL 2 TIMES DAILY
Qty: 90 TABLET | Refills: 1 | Status: SHIPPED | OUTPATIENT
Start: 2023-10-17

## 2023-10-17 RX ORDER — ATORVASTATIN CALCIUM 80 MG/1
80 TABLET, FILM COATED ORAL DAILY
Qty: 90 TABLET | Refills: 1 | Status: SHIPPED | OUTPATIENT
Start: 2023-10-17

## 2023-10-17 RX ORDER — CYANOCOBALAMIN (VITAMIN B-12) 500 MCG
1000 TABLET ORAL DAILY
Qty: 90 TABLET | Refills: 0 | Status: SHIPPED | OUTPATIENT
Start: 2023-10-17

## 2023-10-17 RX ORDER — LISINOPRIL 2.5 MG/1
2.5 TABLET ORAL DAILY
Qty: 90 TABLET | Refills: 1 | Status: SHIPPED | OUTPATIENT
Start: 2023-10-17

## 2023-10-17 RX ORDER — FENOFIBRATE 145 MG/1
145 TABLET, COATED ORAL DAILY
Qty: 90 TABLET | Refills: 1 | Status: SHIPPED | OUTPATIENT
Start: 2023-10-17

## 2024-01-09 ENCOUNTER — NURSE ONLY (OUTPATIENT)
Dept: LAB | Age: 84
End: 2024-01-09

## 2024-01-09 DIAGNOSIS — N18.32 STAGE 3B CHRONIC KIDNEY DISEASE (HCC): ICD-10-CM

## 2024-01-09 LAB
25(OH)D3 SERPL-MCNC: 34 NG/ML (ref 30–100)
ANION GAP SERPL CALC-SCNC: 14 MEQ/L (ref 8–16)
BUN SERPL-MCNC: 37 MG/DL (ref 7–22)
CALCIUM SERPL-MCNC: 10.4 MG/DL (ref 8.5–10.5)
CHLORIDE SERPL-SCNC: 103 MEQ/L (ref 98–111)
CO2 SERPL-SCNC: 25 MEQ/L (ref 23–33)
CREAT SERPL-MCNC: 1.3 MG/DL (ref 0.4–1.2)
CREAT UR-MCNC: 175.1 MG/DL
DEPRECATED RDW RBC AUTO: 50 FL (ref 35–45)
ERYTHROCYTE [DISTWIDTH] IN BLOOD BY AUTOMATED COUNT: 13.7 % (ref 11.5–14.5)
GFR SERPL CREATININE-BSD FRML MDRD: 55 ML/MIN/1.73M2
GLUCOSE SERPL-MCNC: 130 MG/DL (ref 70–108)
HCT VFR BLD AUTO: 40.3 % (ref 42–52)
HGB BLD-MCNC: 12.9 GM/DL (ref 14–18)
MCH RBC QN AUTO: 31.2 PG (ref 26–33)
MCHC RBC AUTO-ENTMCNC: 32 GM/DL (ref 32.2–35.5)
MCV RBC AUTO: 97.6 FL (ref 80–94)
MICROALBUMIN UR-MCNC: 2.74 MG/DL
MICROALBUMIN/CREAT RATIO PNL UR: 16 MG/G (ref 0–30)
PHOSPHATE SERPL-MCNC: 3.3 MG/DL (ref 2.4–4.7)
PLATELET # BLD AUTO: 251 THOU/MM3 (ref 130–400)
PMV BLD AUTO: 9.5 FL (ref 9.4–12.4)
POTASSIUM SERPL-SCNC: 4 MEQ/L (ref 3.5–5.2)
PTH-INTACT SERPL-MCNC: 33.3 PG/ML (ref 15–65)
RBC # BLD AUTO: 4.13 MILL/MM3 (ref 4.7–6.1)
SODIUM SERPL-SCNC: 142 MEQ/L (ref 135–145)
WBC # BLD AUTO: 4.4 THOU/MM3 (ref 4.8–10.8)

## 2024-01-18 ENCOUNTER — OFFICE VISIT (OUTPATIENT)
Dept: NEPHROLOGY | Age: 84
End: 2024-01-18
Payer: MEDICARE

## 2024-01-18 VITALS
HEART RATE: 76 BPM | WEIGHT: 170 LBS | SYSTOLIC BLOOD PRESSURE: 135 MMHG | DIASTOLIC BLOOD PRESSURE: 62 MMHG | BODY MASS INDEX: 23.72 KG/M2 | OXYGEN SATURATION: 98 %

## 2024-01-18 DIAGNOSIS — N18.32 STAGE 3B CHRONIC KIDNEY DISEASE (HCC): ICD-10-CM

## 2024-01-18 DIAGNOSIS — N18.31 STAGE 3A CHRONIC KIDNEY DISEASE (HCC): Primary | ICD-10-CM

## 2024-01-18 DIAGNOSIS — E11.65 TYPE 2 DIABETES MELLITUS WITH HYPERGLYCEMIA, WITHOUT LONG-TERM CURRENT USE OF INSULIN (HCC): ICD-10-CM

## 2024-01-18 PROCEDURE — 1123F ACP DISCUSS/DSCN MKR DOCD: CPT | Performed by: INTERNAL MEDICINE

## 2024-01-18 PROCEDURE — 99213 OFFICE O/P EST LOW 20 MIN: CPT | Performed by: INTERNAL MEDICINE

## 2024-01-18 PROCEDURE — 3078F DIAST BP <80 MM HG: CPT | Performed by: INTERNAL MEDICINE

## 2024-01-18 PROCEDURE — 3075F SYST BP GE 130 - 139MM HG: CPT | Performed by: INTERNAL MEDICINE

## 2024-01-18 PROCEDURE — 1036F TOBACCO NON-USER: CPT | Performed by: INTERNAL MEDICINE

## 2024-01-18 PROCEDURE — G8427 DOCREV CUR MEDS BY ELIG CLIN: HCPCS | Performed by: INTERNAL MEDICINE

## 2024-01-18 PROCEDURE — G8420 CALC BMI NORM PARAMETERS: HCPCS | Performed by: INTERNAL MEDICINE

## 2024-01-18 PROCEDURE — G8484 FLU IMMUNIZE NO ADMIN: HCPCS | Performed by: INTERNAL MEDICINE

## 2024-01-18 NOTE — PROGRESS NOTES
[facility] [place] [facility]  [facility] KIDNEY AND HYPERTENSION  [address]  [address]  [facility] [address]  Dept: [phone]  Loc: [phone]  Progress Note  1/18/2024 2:13 PM      Pt Name:    Kain Nobles  YOB: 1940  Primary Care Physician:  Monty Landon MD     Chief Complaint:   Chief Complaint   Patient presents with    Follow-up     6 mo fu CKDIII        History of Present Illness:   This is a follow-up visit for CKD III.   He has hx of DM, HTN, hyperlipidemia, CAD, renal cysts.       Doing well.   Bp controlled.   Sugars controlled.   No edema.   No complaints.        Pertinent items are noted in HPI.         Past History:  Past Medical History:   Diagnosis Date    CAD (coronary artery disease)     % MANAGED MEDICALLY    Hyperlipidemia     Hypertension     Left ventricular dysfunction     MILD    Type II or unspecified type diabetes mellitus without mention of complication, not stated as uncontrolled      Past Surgical History:   Procedure Laterality Date    ADENOIDECTOMY  at the age of 7 yrs     TONSILLECTOMY  at the age of 7 yrs old        VITALS:  /62 (Site: Left Upper Arm, Position: Sitting, Cuff Size: Large Adult)   Pulse 76   Wt 77.1 kg (170 lb)   SpO2 98%   BMI 23.72 kg/m²   Wt Readings from Last 3 Encounters:   01/18/24 77.1 kg (170 lb)   12/19/23 75.1 kg (165 lb 9.6 oz)   10/06/23 75.3 kg (166 lb)     Body mass index is 23.72 kg/m².     General Appearance: alert and cooperative with exam, appears comfortable, no distress  HEENT: EOMI, moist oral mucus membranes  Neck: No jugular venous distention  Lungs: Air entry B/L, no crackles or rales, no use of accessory muscles  Heart: S1, S2 heard, no rub  GI: soft, non-tender, no guarding,  Extremities: no significant edema  Skin: warm, dry  Neurologic: no tremor, no asterixis     Medications:  Current Outpatient Medications   Medication Sig Dispense Refill    lisinopril (PRINIVIL;ZESTRIL)

## 2024-03-26 ENCOUNTER — OFFICE VISIT (OUTPATIENT)
Dept: INTERNAL MEDICINE CLINIC | Age: 84
End: 2024-03-26
Payer: MEDICARE

## 2024-03-26 VITALS
HEART RATE: 80 BPM | WEIGHT: 166.2 LBS | SYSTOLIC BLOOD PRESSURE: 118 MMHG | HEIGHT: 71 IN | DIASTOLIC BLOOD PRESSURE: 68 MMHG | BODY MASS INDEX: 23.27 KG/M2 | TEMPERATURE: 97.6 F

## 2024-03-26 DIAGNOSIS — E78.2 MIXED HYPERLIPIDEMIA: ICD-10-CM

## 2024-03-26 DIAGNOSIS — I10 ESSENTIAL HYPERTENSION: ICD-10-CM

## 2024-03-26 DIAGNOSIS — E11.65 TYPE 2 DIABETES MELLITUS WITH HYPERGLYCEMIA, WITHOUT LONG-TERM CURRENT USE OF INSULIN (HCC): Primary | ICD-10-CM

## 2024-03-26 LAB — HBA1C MFR BLD: 6.8 % (ref 4.3–5.7)

## 2024-03-26 PROCEDURE — 3044F HG A1C LEVEL LT 7.0%: CPT | Performed by: STUDENT IN AN ORGANIZED HEALTH CARE EDUCATION/TRAINING PROGRAM

## 2024-03-26 PROCEDURE — G8484 FLU IMMUNIZE NO ADMIN: HCPCS | Performed by: STUDENT IN AN ORGANIZED HEALTH CARE EDUCATION/TRAINING PROGRAM

## 2024-03-26 PROCEDURE — 1123F ACP DISCUSS/DSCN MKR DOCD: CPT | Performed by: STUDENT IN AN ORGANIZED HEALTH CARE EDUCATION/TRAINING PROGRAM

## 2024-03-26 PROCEDURE — 1036F TOBACCO NON-USER: CPT | Performed by: STUDENT IN AN ORGANIZED HEALTH CARE EDUCATION/TRAINING PROGRAM

## 2024-03-26 PROCEDURE — 99214 OFFICE O/P EST MOD 30 MIN: CPT | Performed by: STUDENT IN AN ORGANIZED HEALTH CARE EDUCATION/TRAINING PROGRAM

## 2024-03-26 PROCEDURE — 3078F DIAST BP <80 MM HG: CPT | Performed by: STUDENT IN AN ORGANIZED HEALTH CARE EDUCATION/TRAINING PROGRAM

## 2024-03-26 PROCEDURE — G8427 DOCREV CUR MEDS BY ELIG CLIN: HCPCS | Performed by: STUDENT IN AN ORGANIZED HEALTH CARE EDUCATION/TRAINING PROGRAM

## 2024-03-26 PROCEDURE — 3074F SYST BP LT 130 MM HG: CPT | Performed by: STUDENT IN AN ORGANIZED HEALTH CARE EDUCATION/TRAINING PROGRAM

## 2024-03-26 PROCEDURE — G8420 CALC BMI NORM PARAMETERS: HCPCS | Performed by: STUDENT IN AN ORGANIZED HEALTH CARE EDUCATION/TRAINING PROGRAM

## 2024-03-26 RX ORDER — ATORVASTATIN CALCIUM 80 MG/1
80 TABLET, FILM COATED ORAL DAILY
Qty: 90 TABLET | Refills: 1 | Status: SHIPPED | OUTPATIENT
Start: 2024-03-26 | End: 2024-03-26

## 2024-03-26 RX ORDER — FENOFIBRATE 145 MG/1
145 TABLET, COATED ORAL DAILY
Qty: 90 TABLET | Refills: 1 | Status: SHIPPED | OUTPATIENT
Start: 2024-03-26

## 2024-03-26 RX ORDER — ATORVASTATIN CALCIUM 80 MG/1
80 TABLET, FILM COATED ORAL DAILY
Qty: 90 TABLET | Refills: 1 | Status: SHIPPED | OUTPATIENT
Start: 2024-03-26

## 2024-03-26 RX ORDER — LISINOPRIL 2.5 MG/1
2.5 TABLET ORAL DAILY
Qty: 90 TABLET | Refills: 1 | Status: SHIPPED | OUTPATIENT
Start: 2024-03-26

## 2024-03-26 RX ORDER — METOPROLOL TARTRATE 50 MG/1
25 TABLET, FILM COATED ORAL 2 TIMES DAILY
Qty: 90 TABLET | Refills: 1 | Status: SHIPPED | OUTPATIENT
Start: 2024-03-26

## 2024-03-26 ASSESSMENT — PATIENT HEALTH QUESTIONNAIRE - PHQ9
2. FEELING DOWN, DEPRESSED OR HOPELESS: NOT AT ALL
SUM OF ALL RESPONSES TO PHQ QUESTIONS 1-9: 0
SUM OF ALL RESPONSES TO PHQ9 QUESTIONS 1 & 2: 0
SUM OF ALL RESPONSES TO PHQ QUESTIONS 1-9: 0
1. LITTLE INTEREST OR PLEASURE IN DOING THINGS: NOT AT ALL
SUM OF ALL RESPONSES TO PHQ QUESTIONS 1-9: 0
SUM OF ALL RESPONSES TO PHQ QUESTIONS 1-9: 0

## 2024-03-26 NOTE — PROGRESS NOTES
1940    Chief Complaint   Patient presents with    Diabetes     3 months         HPI  The patient is a 83 year old male with a PMH of CAD s/p stent, HTN, HLD, DMII who presents to clinic for follow up visit. He is a patient of Dr. Barbi MD. He follows at our clinic for diabetes management.      Diabetes Mellitus -  Last HgA1c 6.4 12/2023 - controlled, on ozempic.  Patient does checks FSBS daily. -170s. He was encouraged to do this and bring back log of BS.    [facility] with no DM retinopathy. Patient reminded to have eye exam done yearly. Normal renal function, eGFR 55 1/2024, microalbumin/cr 16 1/2024.  Patient small right foot big toe bone spur with erythema.  Last foot exam 3/2024.  Denies neuropathy. No autonomic dysfunction.  LDL 63 6/2023. Patient is on an Aspirin, Ace Inhibitor, and statin.       HLD - The patient endorses taking his statin, fibrate.  He denies any adverse effects.  He states his diet could only consist of balanced meal, avoids fatty food. He reports playing gold, bowling and walking for exercise.      Past Medical History:   Diagnosis Date    CAD (coronary artery disease)     % MANAGED MEDICALLY    Hyperlipidemia     Hypertension     Left ventricular dysfunction     MILD    Type II or unspecified type diabetes mellitus without mention of complication, not stated as uncontrolled        Past Surgical History:   Procedure Laterality Date    ADENOIDECTOMY  at the age of 7 yrs     TONSILLECTOMY  at the age of 7 yrs old       Current Outpatient Medications   Medication Sig Dispense Refill    fenofibrate (TRICOR) 145 MG tablet Take 1 tablet by mouth daily 90 tablet 1    lisinopril (PRINIVIL;ZESTRIL) 2.5 MG tablet Take 1 tablet by mouth daily 90 tablet 1    metoprolol tartrate (LOPRESSOR) 50 MG tablet Take 0.5 tablets by mouth 2 times daily 90 tablet 1    Semaglutide,0.25 or 0.5MG/DOS, 2 MG/1.5ML SOPN Inject 1 mg into the skin once a week 4 Adjustable Dose

## 2024-05-20 ENCOUNTER — TELEPHONE (OUTPATIENT)
Dept: INTERNAL MEDICINE CLINIC | Age: 84
End: 2024-05-20

## 2024-05-21 DIAGNOSIS — E78.2 MIXED HYPERLIPIDEMIA: Primary | ICD-10-CM

## 2024-05-22 NOTE — TELEPHONE ENCOUNTER
Labs ordered and Pt notified  
Pt called in stating you had told him you wanted some labs done but there are no orders. Lipid panel was mentioned in progress note. Do you want that drawn now?    Please advise  
No

## 2024-05-24 ENCOUNTER — NURSE ONLY (OUTPATIENT)
Dept: LAB | Age: 84
End: 2024-05-24

## 2024-05-24 DIAGNOSIS — E78.2 MIXED HYPERLIPIDEMIA: ICD-10-CM

## 2024-05-24 LAB
CHOLESTEROL, FASTING: 120 MG/DL (ref 100–199)
HDLC SERPL-MCNC: 35 MG/DL
LDLC SERPL CALC-MCNC: 58 MG/DL
TRIGLYCERIDE, FASTING: 133 MG/DL (ref 0–199)

## 2024-05-28 ENCOUNTER — OFFICE VISIT (OUTPATIENT)
Dept: INTERNAL MEDICINE CLINIC | Age: 84
End: 2024-05-28
Payer: MEDICARE

## 2024-05-28 VITALS
BODY MASS INDEX: 23.35 KG/M2 | WEIGHT: 166.8 LBS | HEIGHT: 71 IN | DIASTOLIC BLOOD PRESSURE: 60 MMHG | OXYGEN SATURATION: 98 % | HEART RATE: 57 BPM | SYSTOLIC BLOOD PRESSURE: 128 MMHG

## 2024-05-28 DIAGNOSIS — I10 PRIMARY HYPERTENSION: ICD-10-CM

## 2024-05-28 DIAGNOSIS — N18.32 STAGE 3B CHRONIC KIDNEY DISEASE (HCC): ICD-10-CM

## 2024-05-28 DIAGNOSIS — E11.9 TYPE 2 DIABETES MELLITUS WITHOUT COMPLICATION, WITHOUT LONG-TERM CURRENT USE OF INSULIN (HCC): Primary | ICD-10-CM

## 2024-05-28 DIAGNOSIS — E78.2 MIXED HYPERLIPIDEMIA: ICD-10-CM

## 2024-05-28 PROCEDURE — G8420 CALC BMI NORM PARAMETERS: HCPCS | Performed by: STUDENT IN AN ORGANIZED HEALTH CARE EDUCATION/TRAINING PROGRAM

## 2024-05-28 PROCEDURE — 1123F ACP DISCUSS/DSCN MKR DOCD: CPT | Performed by: STUDENT IN AN ORGANIZED HEALTH CARE EDUCATION/TRAINING PROGRAM

## 2024-05-28 PROCEDURE — 3074F SYST BP LT 130 MM HG: CPT | Performed by: STUDENT IN AN ORGANIZED HEALTH CARE EDUCATION/TRAINING PROGRAM

## 2024-05-28 PROCEDURE — 99213 OFFICE O/P EST LOW 20 MIN: CPT | Performed by: STUDENT IN AN ORGANIZED HEALTH CARE EDUCATION/TRAINING PROGRAM

## 2024-05-28 PROCEDURE — 3044F HG A1C LEVEL LT 7.0%: CPT | Performed by: STUDENT IN AN ORGANIZED HEALTH CARE EDUCATION/TRAINING PROGRAM

## 2024-05-28 PROCEDURE — 1036F TOBACCO NON-USER: CPT | Performed by: STUDENT IN AN ORGANIZED HEALTH CARE EDUCATION/TRAINING PROGRAM

## 2024-05-28 PROCEDURE — G8427 DOCREV CUR MEDS BY ELIG CLIN: HCPCS | Performed by: STUDENT IN AN ORGANIZED HEALTH CARE EDUCATION/TRAINING PROGRAM

## 2024-05-28 PROCEDURE — 3078F DIAST BP <80 MM HG: CPT | Performed by: STUDENT IN AN ORGANIZED HEALTH CARE EDUCATION/TRAINING PROGRAM

## 2024-05-28 NOTE — PROGRESS NOTES
1940    Chief Complaint   Patient presents with    Diabetes    Hyperlipidemia    Hypertension       HPI  The patient is a 84 year old male with a  PMH of CAD s/p stent, HTN, HLD, DMII who presents to clinic for follow up visit. He is a patient of Dr. Barbi MD. He follows at our clinic for diabetes management.      Diabetes Mellitus -  Last HgA1c 6.8 3/2024 - controlled, on ozempic.  Patient does checks FSBS daily. -140s.   [facility], with no DM retinopathy. Patient reminded to have eye exam done yearly. Normal renal function, eGFR 55 1/2024, microalbumin/cr 16 1/2024.  Patient small right foot big toe bone spur with erythema.  Last foot exam 3/2024.  Denies neuropathy. No autonomic dysfunction.  LDL 58 5/2024. Patient is on an Aspirin, Ace Inhibitor, and statin.       HLD - The patient endorses taking his statin, fibrate.  He denies any adverse effects.  He states his diet could only consist of balanced meal, avoids fatty food. Endorses decent exercise, but reports he can do more.       Past Medical History:   Diagnosis Date    CAD (coronary artery disease)     % MANAGED MEDICALLY    Hyperlipidemia     Hypertension     Left ventricular dysfunction     MILD    Type II or unspecified type diabetes mellitus without mention of complication, not stated as uncontrolled        Past Surgical History:   Procedure Laterality Date    ADENOIDECTOMY  at the age of 7 yrs     TONSILLECTOMY  at the age of 7 yrs old       Current Outpatient Medications   Medication Sig Dispense Refill    fenofibrate (TRICOR) 145 MG tablet Take 1 tablet by mouth daily 90 tablet 1    lisinopril (PRINIVIL;ZESTRIL) 2.5 MG tablet Take 1 tablet by mouth daily 90 tablet 1    metoprolol tartrate (LOPRESSOR) 50 MG tablet Take 0.5 tablets by mouth 2 times daily 90 tablet 1    Semaglutide,0.25 or 0.5MG/DOS, 2 MG/1.5ML SOPN Inject 1 mg into the skin once a week 4 Adjustable Dose Pre-filled Pen Syringe 5    atorvastatin

## 2024-06-25 ENCOUNTER — OFFICE VISIT (OUTPATIENT)
Dept: INTERNAL MEDICINE CLINIC | Age: 84
End: 2024-06-25
Payer: MEDICARE

## 2024-06-25 VITALS
HEART RATE: 60 BPM | WEIGHT: 167.8 LBS | BODY MASS INDEX: 23.41 KG/M2 | TEMPERATURE: 98.2 F | SYSTOLIC BLOOD PRESSURE: 126 MMHG | RESPIRATION RATE: 18 BRPM | OXYGEN SATURATION: 98 % | DIASTOLIC BLOOD PRESSURE: 68 MMHG

## 2024-06-25 DIAGNOSIS — I25.119 ATHEROSCLEROSIS OF NATIVE CORONARY ARTERY OF NATIVE HEART WITH ANGINA PECTORIS (HCC): ICD-10-CM

## 2024-06-25 DIAGNOSIS — N18.30 STAGE 3 CHRONIC KIDNEY DISEASE, UNSPECIFIED WHETHER STAGE 3A OR 3B CKD (HCC): ICD-10-CM

## 2024-06-25 DIAGNOSIS — N18.30 TYPE 2 DIABETES MELLITUS WITH STAGE 3 CHRONIC KIDNEY DISEASE, WITHOUT LONG-TERM CURRENT USE OF INSULIN, UNSPECIFIED WHETHER STAGE 3A OR 3B CKD (HCC): Primary | ICD-10-CM

## 2024-06-25 DIAGNOSIS — I50.22 CHRONIC SYSTOLIC (CONGESTIVE) HEART FAILURE (HCC): ICD-10-CM

## 2024-06-25 DIAGNOSIS — E78.2 MIXED HYPERLIPIDEMIA: ICD-10-CM

## 2024-06-25 DIAGNOSIS — E11.22 TYPE 2 DIABETES MELLITUS WITH STAGE 3 CHRONIC KIDNEY DISEASE, WITHOUT LONG-TERM CURRENT USE OF INSULIN, UNSPECIFIED WHETHER STAGE 3A OR 3B CKD (HCC): Primary | ICD-10-CM

## 2024-06-25 DIAGNOSIS — I10 PRIMARY HYPERTENSION: ICD-10-CM

## 2024-06-25 PROBLEM — I20.9 ANGINA PECTORIS, UNSPECIFIED (HCC): Status: ACTIVE | Noted: 2024-06-25

## 2024-06-25 PROCEDURE — G8420 CALC BMI NORM PARAMETERS: HCPCS

## 2024-06-25 PROCEDURE — 3078F DIAST BP <80 MM HG: CPT

## 2024-06-25 PROCEDURE — G8427 DOCREV CUR MEDS BY ELIG CLIN: HCPCS

## 2024-06-25 PROCEDURE — 1036F TOBACCO NON-USER: CPT

## 2024-06-25 PROCEDURE — 3074F SYST BP LT 130 MM HG: CPT

## 2024-06-25 PROCEDURE — 1123F ACP DISCUSS/DSCN MKR DOCD: CPT

## 2024-06-25 PROCEDURE — 3044F HG A1C LEVEL LT 7.0%: CPT

## 2024-06-25 PROCEDURE — 99212 OFFICE O/P EST SF 10 MIN: CPT

## 2024-06-25 SDOH — ECONOMIC STABILITY: FOOD INSECURITY: WITHIN THE PAST 12 MONTHS, THE FOOD YOU BOUGHT JUST DIDN'T LAST AND YOU DIDN'T HAVE MONEY TO GET MORE.: NEVER TRUE

## 2024-06-25 SDOH — ECONOMIC STABILITY: INCOME INSECURITY: HOW HARD IS IT FOR YOU TO PAY FOR THE VERY BASICS LIKE FOOD, HOUSING, MEDICAL CARE, AND HEATING?: NOT HARD AT ALL

## 2024-06-25 SDOH — ECONOMIC STABILITY: FOOD INSECURITY: WITHIN THE PAST 12 MONTHS, YOU WORRIED THAT YOUR FOOD WOULD RUN OUT BEFORE YOU GOT MONEY TO BUY MORE.: NEVER TRUE

## 2024-06-25 NOTE — PROGRESS NOTES
1940    Chief Complaint   Patient presents with    Diabetes     3/26 HbA1c 6.8    Hyperlipidemia    Hypertension       Pt is a 84 y.o. male who presents for an initial visit.  She is a previous patient of Dr. Carmona.  He is presenting to the clinic today to meet me as he was informed his previous resident physician provider was graduating and leaving [facility].  He has no complaints today.  He has a PMH of CAD, HTN, HLD, DM2.  Also a patient of Dr. Landon who is his PCP, he follows here for diabetes management.        Past Medical History:   Diagnosis Date    CAD (coronary artery disease)     % MANAGED MEDICALLY    Hyperlipidemia     Hypertension     Left ventricular dysfunction     MILD    Type II or unspecified type diabetes mellitus without mention of complication, not stated as uncontrolled        Past Surgical History:   Procedure Laterality Date    ADENOIDECTOMY  at the age of 7 yrs     TONSILLECTOMY  at the age of 7 yrs old       Current Outpatient Medications   Medication Sig Dispense Refill    fenofibrate (TRICOR) 145 MG tablet Take 1 tablet by mouth daily 90 tablet 1    lisinopril (PRINIVIL;ZESTRIL) 2.5 MG tablet Take 1 tablet by mouth daily 90 tablet 1    metoprolol tartrate (LOPRESSOR) 50 MG tablet Take 0.5 tablets by mouth 2 times daily 90 tablet 1    Semaglutide,0.25 or 0.5MG/DOS, 2 MG/1.5ML SOPN Inject 1 mg into the skin once a week 4 Adjustable Dose Pre-filled Pen Syringe 5    atorvastatin (LIPITOR) 80 MG tablet Take 1 tablet by mouth daily 90 tablet 1    Cyanocobalamin (VITAMIN B 12) 500 MCG TABS Take 1,000 mg by mouth daily 90 tablet 0    blood glucose monitor kit and supplies Check twice daily and as needed for symptoms of low/high blood glucose. Dispense sufficient amount for indicated testing frequency plus additional to accommodate PRN testing needs. Dispense all needed supplies to include: monitor, strips, lancing device, lancets, control solutions, alcohol swabs. 1 kit 0    Glucose Blood

## 2024-09-24 ENCOUNTER — LAB (OUTPATIENT)
Dept: LAB | Age: 84
End: 2024-09-24

## 2024-09-24 ENCOUNTER — OFFICE VISIT (OUTPATIENT)
Dept: INTERNAL MEDICINE CLINIC | Age: 84
End: 2024-09-24
Payer: MEDICARE

## 2024-09-24 VITALS
SYSTOLIC BLOOD PRESSURE: 130 MMHG | BODY MASS INDEX: 23.88 KG/M2 | HEIGHT: 71 IN | WEIGHT: 170.6 LBS | OXYGEN SATURATION: 96 % | DIASTOLIC BLOOD PRESSURE: 60 MMHG | HEART RATE: 65 BPM

## 2024-09-24 DIAGNOSIS — E78.2 MIXED HYPERLIPIDEMIA: ICD-10-CM

## 2024-09-24 DIAGNOSIS — I10 ESSENTIAL HYPERTENSION: ICD-10-CM

## 2024-09-24 DIAGNOSIS — E11.22 TYPE 2 DIABETES MELLITUS WITH STAGE 3 CHRONIC KIDNEY DISEASE, WITHOUT LONG-TERM CURRENT USE OF INSULIN, UNSPECIFIED WHETHER STAGE 3A OR 3B CKD (HCC): ICD-10-CM

## 2024-09-24 DIAGNOSIS — N18.30 TYPE 2 DIABETES MELLITUS WITH STAGE 3 CHRONIC KIDNEY DISEASE, WITHOUT LONG-TERM CURRENT USE OF INSULIN, UNSPECIFIED WHETHER STAGE 3A OR 3B CKD (HCC): ICD-10-CM

## 2024-09-24 DIAGNOSIS — N18.30 TYPE 2 DIABETES MELLITUS WITH STAGE 3 CHRONIC KIDNEY DISEASE, WITHOUT LONG-TERM CURRENT USE OF INSULIN, UNSPECIFIED WHETHER STAGE 3A OR 3B CKD (HCC): Primary | ICD-10-CM

## 2024-09-24 DIAGNOSIS — E11.22 TYPE 2 DIABETES MELLITUS WITH STAGE 3 CHRONIC KIDNEY DISEASE, WITHOUT LONG-TERM CURRENT USE OF INSULIN, UNSPECIFIED WHETHER STAGE 3A OR 3B CKD (HCC): Primary | ICD-10-CM

## 2024-09-24 LAB
ANION GAP SERPL CALC-SCNC: 6 MEQ/L (ref 8–16)
BUN SERPL-MCNC: 28 MG/DL (ref 7–22)
CALCIUM SERPL-MCNC: 9.8 MG/DL (ref 8.5–10.5)
CHLORIDE SERPL-SCNC: 104 MEQ/L (ref 98–111)
CO2 SERPL-SCNC: 30 MEQ/L (ref 23–33)
CREAT SERPL-MCNC: 1.2 MG/DL (ref 0.4–1.2)
GFR SERPL CREATININE-BSD FRML MDRD: 60 ML/MIN/1.73M2
GLUCOSE SERPL-MCNC: 111 MG/DL (ref 70–108)
HBA1C MFR BLD: 6.6 % (ref 4.3–5.7)
POTASSIUM SERPL-SCNC: 4.7 MEQ/L (ref 3.5–5.2)
SODIUM SERPL-SCNC: 140 MEQ/L (ref 135–145)

## 2024-09-24 PROCEDURE — 1123F ACP DISCUSS/DSCN MKR DOCD: CPT | Performed by: STUDENT IN AN ORGANIZED HEALTH CARE EDUCATION/TRAINING PROGRAM

## 2024-09-24 PROCEDURE — 3075F SYST BP GE 130 - 139MM HG: CPT | Performed by: STUDENT IN AN ORGANIZED HEALTH CARE EDUCATION/TRAINING PROGRAM

## 2024-09-24 PROCEDURE — 99214 OFFICE O/P EST MOD 30 MIN: CPT | Performed by: STUDENT IN AN ORGANIZED HEALTH CARE EDUCATION/TRAINING PROGRAM

## 2024-09-24 PROCEDURE — 1159F MED LIST DOCD IN RCRD: CPT | Performed by: STUDENT IN AN ORGANIZED HEALTH CARE EDUCATION/TRAINING PROGRAM

## 2024-09-24 PROCEDURE — 3078F DIAST BP <80 MM HG: CPT | Performed by: STUDENT IN AN ORGANIZED HEALTH CARE EDUCATION/TRAINING PROGRAM

## 2024-09-24 PROCEDURE — G8427 DOCREV CUR MEDS BY ELIG CLIN: HCPCS | Performed by: STUDENT IN AN ORGANIZED HEALTH CARE EDUCATION/TRAINING PROGRAM

## 2024-09-24 PROCEDURE — 3044F HG A1C LEVEL LT 7.0%: CPT | Performed by: STUDENT IN AN ORGANIZED HEALTH CARE EDUCATION/TRAINING PROGRAM

## 2024-09-24 PROCEDURE — G8420 CALC BMI NORM PARAMETERS: HCPCS | Performed by: STUDENT IN AN ORGANIZED HEALTH CARE EDUCATION/TRAINING PROGRAM

## 2024-09-24 PROCEDURE — 1036F TOBACCO NON-USER: CPT | Performed by: STUDENT IN AN ORGANIZED HEALTH CARE EDUCATION/TRAINING PROGRAM

## 2024-09-24 PROCEDURE — 83036 HEMOGLOBIN GLYCOSYLATED A1C: CPT | Performed by: STUDENT IN AN ORGANIZED HEALTH CARE EDUCATION/TRAINING PROGRAM

## 2024-09-24 RX ORDER — ATORVASTATIN CALCIUM 80 MG/1
80 TABLET, FILM COATED ORAL DAILY
Qty: 90 TABLET | Refills: 1 | Status: SHIPPED | OUTPATIENT
Start: 2024-09-24

## 2024-09-24 RX ORDER — FENOFIBRATE 145 MG/1
145 TABLET, COATED ORAL DAILY
Qty: 90 TABLET | Refills: 1 | Status: SHIPPED | OUTPATIENT
Start: 2024-09-24

## 2024-09-24 RX ORDER — PROMETHAZINE HYDROCHLORIDE AND CODEINE PHOSPHATE 6.25; 1 MG/5ML; MG/5ML
SOLUTION ORAL
COMMUNITY
Start: 2024-09-19

## 2024-09-24 RX ORDER — LISINOPRIL 2.5 MG/1
2.5 TABLET ORAL DAILY
Qty: 90 TABLET | Refills: 1 | Status: SHIPPED | OUTPATIENT
Start: 2024-09-24

## 2024-09-24 RX ORDER — METOPROLOL TARTRATE 50 MG
25 TABLET ORAL 2 TIMES DAILY
Qty: 90 TABLET | Refills: 1 | Status: SHIPPED | OUTPATIENT
Start: 2024-09-24

## 2024-09-24 NOTE — PROGRESS NOTES
1940    Chief Complaint   Patient presents with    Diabetes    Hypertension    Hyperlipidemia    Chronic Kidney Disease       HPI    Pt is a 84 y.o. male who presents for a follow up for his diabetes and medication refill.Patient is currently doing well no new complaints. He did get a cold a week ago and he is taking cough medicine which is helping. His A1c this visit was 6.6 improved from 6.8 per last A1c. Patient denies any symptoms of nausea,vomiting,chest pain, shortness of breath. He is tolerating his current Ozempic dose well. On this visit diabetic foot exam was completed this visit.       SUBJECTIVE:  84 y.o. male for follow up of diabetes. Diabetic Review of Systems - medication compliance: compliant most of the time, diabetic diet compliance: compliant most of the time.  Other symptoms and concerns: recent cold.     Current Outpatient Medications   Medication Sig Dispense Refill    atorvastatin (LIPITOR) 80 MG tablet Take 1 tablet by mouth daily 90 tablet 1    fenofibrate (TRICOR) 145 MG tablet Take 1 tablet by mouth daily 90 tablet 1    lisinopril (PRINIVIL;ZESTRIL) 2.5 MG tablet Take 1 tablet by mouth daily 90 tablet 1    metoprolol tartrate (LOPRESSOR) 50 MG tablet Take 0.5 tablets by mouth 2 times daily 90 tablet 1    Semaglutide,0.25 or 0.5MG/DOS, 2 MG/1.5ML SOPN Inject 1 mg into the skin once a week 4 Adjustable Dose Pre-filled Pen Syringe 5    promethazine-codeine (PHENERGAN WITH CODEINE) 6.25-10 MG/5ML SOLN solution       amoxicillin-clavulanate (AUGMENTIN) 875-125 MG per tablet Take 1 tablet by mouth 2 times daily      Cyanocobalamin (VITAMIN B 12) 500 MCG TABS Take 1,000 mg by mouth daily 90 tablet 0    Glucose Blood (BLOOD GLUCOSE TEST STRIPS) STRP Test Blood Sugars once daily. Accu-Check test strips. Dx E11.9 100 strip 3    nitroGLYCERIN (NITROSTAT) 0.4 MG SL tablet Place 1 tablet under the tongue every 5 minutes as needed. 25 tablet 5    Multiple Vitamin (MULTI-VITAMIN PO) Take  by mouth.

## 2024-10-10 LAB
ALBUMIN: 3.5 G/DL
ALP BLD-CCNC: 44 U/L
ALT SERPL-CCNC: 9 U/L
ANION GAP SERPL CALCULATED.3IONS-SCNC: 4 MMOL/L
AST SERPL-CCNC: 35 U/L
BILIRUB SERPL-MCNC: 0.5 MG/DL (ref 0.1–1.4)
BUN BLDV-MCNC: 31 MG/DL
CALCIUM SERPL-MCNC: 10.4 MG/DL
CHLORIDE BLD-SCNC: 109 MMOL/L
CHOLESTEROL, TOTAL: 141 MG/DL
CHOLESTEROL/HDL RATIO: NORMAL
CO2: 28 MMOL/L
CREAT SERPL-MCNC: 1.3 MG/DL
CREATININE URINE: 99 MG/DL
ESTIMATED AVERAGE GLUCOSE: 147
GFR, ESTIMATED: 56
GLUCOSE BLD-MCNC: 147 MG/DL
HBA1C MFR BLD: 6.1 %
HDLC SERPL-MCNC: 41 MG/DL (ref 35–70)
LDL CHOLESTEROL: 68
MICROALBUMIN/CREAT 24H UR: <3 MG/DL
MICROALBUMIN/CREAT UR-RTO: NORMAL
NONHDLC SERPL-MCNC: NORMAL MG/DL
POTASSIUM SERPL-SCNC: 4.3 MMOL/L
SODIUM BLD-SCNC: 141 MMOL/L
TOTAL PROTEIN: 6.3 G/DL (ref 6.4–8.2)
TRIGL SERPL-MCNC: 160 MG/DL
VLDLC SERPL CALC-MCNC: NORMAL MG/DL

## 2024-12-23 PROBLEM — E11.65 TYPE 2 DIABETES MELLITUS WITH HYPERGLYCEMIA (HCC): Status: RESOLVED | Noted: 2021-12-08 | Resolved: 2024-12-23

## 2024-12-23 PROBLEM — N18.30 CHRONIC RENAL DISEASE, STAGE III (HCC): Status: RESOLVED | Noted: 2022-05-18 | Resolved: 2024-12-23

## 2024-12-23 PROBLEM — I50.22 CHRONIC SYSTOLIC (CONGESTIVE) HEART FAILURE (HCC): Status: RESOLVED | Noted: 2024-06-25 | Resolved: 2024-12-23

## 2024-12-23 PROBLEM — I20.9 ANGINA PECTORIS, UNSPECIFIED (HCC): Status: RESOLVED | Noted: 2024-06-25 | Resolved: 2024-12-23

## 2024-12-23 PROBLEM — I25.119 ATHEROSCLEROTIC HEART DISEASE OF NATIVE CORONARY ARTERY WITH UNSPECIFIED ANGINA PECTORIS (HCC): Status: RESOLVED | Noted: 2024-06-25 | Resolved: 2024-12-23

## 2025-01-14 ENCOUNTER — LAB (OUTPATIENT)
Dept: LAB | Age: 85
End: 2025-01-14

## 2025-01-14 DIAGNOSIS — N18.31 STAGE 3A CHRONIC KIDNEY DISEASE (HCC): ICD-10-CM

## 2025-01-14 LAB
25(OH)D3 SERPL-MCNC: 35 NG/ML (ref 30–100)
ANION GAP SERPL CALC-SCNC: 10 MEQ/L (ref 8–16)
BUN SERPL-MCNC: 30 MG/DL (ref 7–22)
CALCIUM SERPL-MCNC: 9.6 MG/DL (ref 8.5–10.5)
CHLORIDE SERPL-SCNC: 106 MEQ/L (ref 98–111)
CO2 SERPL-SCNC: 27 MEQ/L (ref 23–33)
CREAT SERPL-MCNC: 1.3 MG/DL (ref 0.4–1.2)
CREAT UR-MCNC: 119.2 MG/DL
DEPRECATED RDW RBC AUTO: 50.5 FL (ref 35–45)
ERYTHROCYTE [DISTWIDTH] IN BLOOD BY AUTOMATED COUNT: 14 % (ref 11.5–14.5)
GFR SERPL CREATININE-BSD FRML MDRD: 55 ML/MIN/1.73M2
GLUCOSE SERPL-MCNC: 152 MG/DL (ref 70–108)
HCT VFR BLD AUTO: 36.8 % (ref 42–52)
HGB BLD-MCNC: 11.6 GM/DL (ref 14–18)
MCH RBC QN AUTO: 30.7 PG (ref 26–33)
MCHC RBC AUTO-ENTMCNC: 31.5 GM/DL (ref 32.2–35.5)
MCV RBC AUTO: 97.4 FL (ref 80–94)
MICROALBUMIN UR-MCNC: 5.07 MG/DL
MICROALBUMIN/CREAT RATIO PNL UR: 43 MG/G (ref 0–30)
PHOSPHATE SERPL-MCNC: 3 MG/DL (ref 2.4–4.7)
PLATELET # BLD AUTO: 235 THOU/MM3 (ref 130–400)
PMV BLD AUTO: 9.8 FL (ref 9.4–12.4)
POTASSIUM SERPL-SCNC: 3.7 MEQ/L (ref 3.5–5.2)
PTH-INTACT SERPL-MCNC: 31.5 PG/ML (ref 15–65)
RBC # BLD AUTO: 3.78 MILL/MM3 (ref 4.7–6.1)
SODIUM SERPL-SCNC: 143 MEQ/L (ref 135–145)
WBC # BLD AUTO: 5.1 THOU/MM3 (ref 4.8–10.8)

## 2025-01-16 ENCOUNTER — OFFICE VISIT (OUTPATIENT)
Dept: NEPHROLOGY | Age: 85
End: 2025-01-16
Payer: MEDICARE

## 2025-01-16 VITALS
OXYGEN SATURATION: 98 % | HEART RATE: 100 BPM | SYSTOLIC BLOOD PRESSURE: 130 MMHG | WEIGHT: 165 LBS | BODY MASS INDEX: 23.02 KG/M2 | DIASTOLIC BLOOD PRESSURE: 66 MMHG

## 2025-01-16 DIAGNOSIS — N18.32 STAGE 3B CHRONIC KIDNEY DISEASE (HCC): ICD-10-CM

## 2025-01-16 DIAGNOSIS — N18.30 TYPE 2 DIABETES MELLITUS WITH STAGE 3 CHRONIC KIDNEY DISEASE, WITHOUT LONG-TERM CURRENT USE OF INSULIN, UNSPECIFIED WHETHER STAGE 3A OR 3B CKD (HCC): ICD-10-CM

## 2025-01-16 DIAGNOSIS — E11.22 TYPE 2 DIABETES MELLITUS WITH STAGE 3 CHRONIC KIDNEY DISEASE, WITHOUT LONG-TERM CURRENT USE OF INSULIN, UNSPECIFIED WHETHER STAGE 3A OR 3B CKD (HCC): ICD-10-CM

## 2025-01-16 DIAGNOSIS — N18.31 STAGE 3A CHRONIC KIDNEY DISEASE (HCC): Primary | ICD-10-CM

## 2025-01-16 PROCEDURE — G8427 DOCREV CUR MEDS BY ELIG CLIN: HCPCS | Performed by: INTERNAL MEDICINE

## 2025-01-16 PROCEDURE — G8420 CALC BMI NORM PARAMETERS: HCPCS | Performed by: INTERNAL MEDICINE

## 2025-01-16 PROCEDURE — 3078F DIAST BP <80 MM HG: CPT | Performed by: INTERNAL MEDICINE

## 2025-01-16 PROCEDURE — 99214 OFFICE O/P EST MOD 30 MIN: CPT | Performed by: INTERNAL MEDICINE

## 2025-01-16 PROCEDURE — 1123F ACP DISCUSS/DSCN MKR DOCD: CPT | Performed by: INTERNAL MEDICINE

## 2025-01-16 PROCEDURE — 3075F SYST BP GE 130 - 139MM HG: CPT | Performed by: INTERNAL MEDICINE

## 2025-01-16 PROCEDURE — 1036F TOBACCO NON-USER: CPT | Performed by: INTERNAL MEDICINE

## 2025-01-16 PROCEDURE — 1159F MED LIST DOCD IN RCRD: CPT | Performed by: INTERNAL MEDICINE

## 2025-01-16 NOTE — PROGRESS NOTES
[facility] [place] [facility]  [facility] KIDNEY AND HYPERTENSION  [address]  [address]  [facility] [address]  Dept: [phone]  Loc: [phone]  Progress Note  1/16/2025 1:59 PM      Pt Name:    Kain Nobles  YOB: 1940  Primary Care Physician:  Monty Landon MD     Chief Complaint:   Chief Complaint   Patient presents with    Follow-up     CKD III        History of Present Illness:   This is a follow-up visit for CKD III.   He has hx of DM, HTN, hyperlipidemia, CAD, renal cysts.       Had GI bug with Nausea, vomiting for 24 hours, starting to feel better today. Feels like he's staying hydrated.  Bp controlled. No edema.         Pertinent items are noted in HPI.         Past History:  Past Medical History:   Diagnosis Date    BPH (benign prostatic hyperplasia)     noted on cystoscopy with Dr. Julio 2018    CAD (coronary artery disease)     % MANAGED MEDICALLY    Hyperlipidemia     Hypertension     Left ventricular dysfunction     MILD    Syncope 02/20/2012    Admitted 2/12, passed out in [employer]      Type II or unspecified type diabetes mellitus without mention of complication, not stated as uncontrolled      Past Surgical History:   Procedure Laterality Date    ADENOIDECTOMY  at the age of 7 yrs     TONSILLECTOMY  at the age of 7 yrs old        VITALS:  /66 (Site: Left Upper Arm, Position: Sitting, Cuff Size: Large Adult)   Pulse 100   Wt 74.8 kg (165 lb)   SpO2 98%   BMI 23.02 kg/m²   Wt Readings from Last 3 Encounters:   01/16/25 74.8 kg (165 lb)   09/24/24 77.4 kg (170 lb 9.6 oz)   06/25/24 76.1 kg (167 lb 12.8 oz)     Body mass index is 23.02 kg/m².     General Appearance: alert and cooperative with exam, appears comfortable, no distress  HEENT: EOMI, moist oral mucus membranes  Neck: No jugular venous distention  Lungs: Air entry B/L, no crackles or rales, no use of accessory muscles  Heart: S1, S2 heard, no rub  GI: soft, non-tender, no

## 2025-03-25 ENCOUNTER — OFFICE VISIT (OUTPATIENT)
Dept: INTERNAL MEDICINE CLINIC | Age: 85
End: 2025-03-25

## 2025-03-25 VITALS
SYSTOLIC BLOOD PRESSURE: 118 MMHG | BODY MASS INDEX: 23.21 KG/M2 | HEART RATE: 68 BPM | HEIGHT: 71 IN | DIASTOLIC BLOOD PRESSURE: 62 MMHG | WEIGHT: 165.8 LBS | TEMPERATURE: 97.6 F

## 2025-03-25 DIAGNOSIS — E78.2 MIXED HYPERLIPIDEMIA: ICD-10-CM

## 2025-03-25 DIAGNOSIS — Z79.4 TYPE 2 DIABETES MELLITUS WITH CHRONIC KIDNEY DISEASE, WITH LONG-TERM CURRENT USE OF INSULIN, UNSPECIFIED CKD STAGE (HCC): Primary | ICD-10-CM

## 2025-03-25 DIAGNOSIS — N18.30 TYPE 2 DIABETES MELLITUS WITH STAGE 3 CHRONIC KIDNEY DISEASE, WITHOUT LONG-TERM CURRENT USE OF INSULIN, UNSPECIFIED WHETHER STAGE 3A OR 3B CKD (HCC): ICD-10-CM

## 2025-03-25 DIAGNOSIS — I10 PRIMARY HYPERTENSION: ICD-10-CM

## 2025-03-25 DIAGNOSIS — E11.22 TYPE 2 DIABETES MELLITUS WITH STAGE 3 CHRONIC KIDNEY DISEASE, WITHOUT LONG-TERM CURRENT USE OF INSULIN, UNSPECIFIED WHETHER STAGE 3A OR 3B CKD (HCC): ICD-10-CM

## 2025-03-25 DIAGNOSIS — I10 ESSENTIAL HYPERTENSION: ICD-10-CM

## 2025-03-25 DIAGNOSIS — E11.22 TYPE 2 DIABETES MELLITUS WITH CHRONIC KIDNEY DISEASE, WITH LONG-TERM CURRENT USE OF INSULIN, UNSPECIFIED CKD STAGE (HCC): Primary | ICD-10-CM

## 2025-03-25 LAB — HBA1C MFR BLD: 6.6 % (ref 4.3–5.7)

## 2025-03-25 RX ORDER — FENOFIBRATE 145 MG/1
145 TABLET, COATED ORAL DAILY
Qty: 90 TABLET | Refills: 1 | Status: SHIPPED | OUTPATIENT
Start: 2025-03-25

## 2025-03-25 RX ORDER — LISINOPRIL 2.5 MG/1
2.5 TABLET ORAL DAILY
Qty: 90 TABLET | Refills: 1 | Status: SHIPPED | OUTPATIENT
Start: 2025-03-25

## 2025-03-25 RX ORDER — ATORVASTATIN CALCIUM 80 MG/1
80 TABLET, FILM COATED ORAL DAILY
Qty: 90 TABLET | Refills: 1 | Status: SHIPPED | OUTPATIENT
Start: 2025-03-25

## 2025-03-25 SDOH — ECONOMIC STABILITY: FOOD INSECURITY: WITHIN THE PAST 12 MONTHS, YOU WORRIED THAT YOUR FOOD WOULD RUN OUT BEFORE YOU GOT MONEY TO BUY MORE.: NEVER TRUE

## 2025-03-25 SDOH — ECONOMIC STABILITY: FOOD INSECURITY: WITHIN THE PAST 12 MONTHS, THE FOOD YOU BOUGHT JUST DIDN'T LAST AND YOU DIDN'T HAVE MONEY TO GET MORE.: NEVER TRUE

## 2025-03-25 ASSESSMENT — PATIENT HEALTH QUESTIONNAIRE - PHQ9
2. FEELING DOWN, DEPRESSED OR HOPELESS: NOT AT ALL
1. LITTLE INTEREST OR PLEASURE IN DOING THINGS: NOT AT ALL
SUM OF ALL RESPONSES TO PHQ QUESTIONS 1-9: 0

## 2025-03-25 NOTE — PROGRESS NOTES
1940    Chief Complaint   Patient presents with    Diabetes     6 months     Hypertension    Hyperlipidemia       85-year-old male with PMH of DM2, HTN, HLD, CKD presenting for 6-month follow-up.  He reports no new complaints and denies side effects of his current medications.  He is compliant with all his medications which include Ozempic, lisinopril, metoprolol, Lipitor, fenofibrate for the above issues.  HbA1c 3/25/2025 6.6.      Past Medical History:   Diagnosis Date    BPH (benign prostatic hyperplasia)     noted on cystoscopy with Dr. Julio 2018    CAD (coronary artery disease)     % MANAGED MEDICALLY    Hyperlipidemia     Hypertension     Left ventricular dysfunction     MILD    Syncope 02/20/2012    Admitted 2/12, passed out in [employer]      Type II or unspecified type diabetes mellitus without mention of complication, not stated as uncontrolled        Past Surgical History:   Procedure Laterality Date    ADENOIDECTOMY  at the age of 7 yrs     TONSILLECTOMY  at the age of 7 yrs old       Current Outpatient Medications   Medication Sig Dispense Refill    atorvastatin (LIPITOR) 80 MG tablet Take 1 tablet by mouth daily 90 tablet 1    fenofibrate (TRICOR) 145 MG tablet Take 1 tablet by mouth daily 90 tablet 1    lisinopril (PRINIVIL;ZESTRIL) 2.5 MG tablet Take 1 tablet by mouth daily 90 tablet 1    metoprolol tartrate (LOPRESSOR) 50 MG tablet Take 0.5 tablets by mouth 2 times daily 90 tablet 1    Semaglutide,0.25 or 0.5MG/DOS, 2 MG/1.5ML SOPN Inject 1 mg into the skin once a week 4 Adjustable Dose Pre-filled Pen Syringe 5    Cyanocobalamin (VITAMIN B 12) 500 MCG TABS Take 1,000 mg by mouth daily 90 tablet 0    blood glucose monitor kit and supplies Check twice daily and as needed for symptoms of low/high blood glucose. Dispense sufficient amount for indicated testing frequency plus additional to accommodate PRN testing needs. Dispense all needed supplies to include: monitor, strips, lancing

## 2025-09-04 ENCOUNTER — APPOINTMENT (OUTPATIENT)
Dept: GENERAL RADIOLOGY | Age: 85
End: 2025-09-04
Payer: MEDICARE

## 2025-09-04 ENCOUNTER — HOSPITAL ENCOUNTER (EMERGENCY)
Age: 85
Discharge: HOME | End: 2025-09-04
Payer: MEDICARE

## 2025-09-04 VITALS
OXYGEN SATURATION: 96 % | WEIGHT: 159 LBS | DIASTOLIC BLOOD PRESSURE: 58 MMHG | SYSTOLIC BLOOD PRESSURE: 114 MMHG | TEMPERATURE: 97.6 F | RESPIRATION RATE: 18 BRPM | BODY MASS INDEX: 22.19 KG/M2 | HEART RATE: 81 BPM

## 2025-09-04 DIAGNOSIS — J06.9 UPPER RESPIRATORY TRACT INFECTION, UNSPECIFIED TYPE: Primary | ICD-10-CM

## 2025-09-04 PROCEDURE — 71046 X-RAY EXAM CHEST 2 VIEWS: CPT

## 2025-09-04 PROCEDURE — 99213 OFFICE O/P EST LOW 20 MIN: CPT

## 2025-09-04 RX ORDER — AZITHROMYCIN 250 MG/1
TABLET, FILM COATED ORAL
Qty: 6 TABLET | Refills: 0 | Status: SHIPPED | OUTPATIENT
Start: 2025-09-04 | End: 2025-09-14

## 2025-09-04 RX ORDER — BENZONATATE 200 MG/1
200 CAPSULE ORAL 3 TIMES DAILY PRN
Qty: 30 CAPSULE | Refills: 0 | Status: SHIPPED | OUTPATIENT
Start: 2025-09-04 | End: 2025-09-14

## 2025-09-04 ASSESSMENT — ENCOUNTER SYMPTOMS
COUGH: 1
EYES NEGATIVE: 1
ALLERGIC/IMMUNOLOGIC NEGATIVE: 1
VOMITING: 0
NAUSEA: 0

## 2025-09-04 ASSESSMENT — PAIN SCALES - GENERAL: PAINLEVEL_OUTOF10: 6

## 2025-09-04 ASSESSMENT — PAIN - FUNCTIONAL ASSESSMENT
PAIN_FUNCTIONAL_ASSESSMENT: 0-10
PAIN_FUNCTIONAL_ASSESSMENT: PREVENTS OR INTERFERES SOME ACTIVE ACTIVITIES AND ADLS

## 2025-09-04 ASSESSMENT — PAIN DESCRIPTION - LOCATION: LOCATION: CHEST

## 2025-09-04 ASSESSMENT — PAIN DESCRIPTION - FREQUENCY: FREQUENCY: INTERMITTENT
